# Patient Record
Sex: FEMALE | Race: WHITE | NOT HISPANIC OR LATINO
[De-identification: names, ages, dates, MRNs, and addresses within clinical notes are randomized per-mention and may not be internally consistent; named-entity substitution may affect disease eponyms.]

---
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
## 2020-09-30 ENCOUNTER — APPOINTMENT (OUTPATIENT)
Dept: OBGYN | Facility: CLINIC | Age: 20
End: 2020-09-30

## 2021-05-20 ENCOUNTER — APPOINTMENT (OUTPATIENT)
Dept: OBGYN | Facility: CLINIC | Age: 21
End: 2021-05-20
Payer: MEDICAID

## 2021-05-20 ENCOUNTER — NON-APPOINTMENT (OUTPATIENT)
Age: 21
End: 2021-05-20

## 2021-05-20 VITALS
HEIGHT: 68.5 IN | DIASTOLIC BLOOD PRESSURE: 70 MMHG | WEIGHT: 131.25 LBS | BODY MASS INDEX: 19.66 KG/M2 | SYSTOLIC BLOOD PRESSURE: 110 MMHG

## 2021-05-20 PROCEDURE — 36415 COLL VENOUS BLD VENIPUNCTURE: CPT

## 2021-05-20 PROCEDURE — 0500F INITIAL PRENATAL CARE VISIT: CPT

## 2021-05-24 LAB
ABO + RH PNL BLD: NORMAL
ALBUMIN SERPL ELPH-MCNC: 4.2 G/DL
ALP BLD-CCNC: 50 U/L
ALT SERPL-CCNC: 41 U/L
ANION GAP SERPL CALC-SCNC: 12 MMOL/L
AST SERPL-CCNC: 26 U/L
BASOPHILS # BLD AUTO: 0.02 K/UL
BASOPHILS NFR BLD AUTO: 0.3 %
BILIRUB SERPL-MCNC: 0.3 MG/DL
BLD GP AB SCN SERPL QL: NORMAL
BUN SERPL-MCNC: 10 MG/DL
C TRACH RRNA SPEC QL NAA+PROBE: NOT DETECTED
CALCIUM SERPL-MCNC: 9.3 MG/DL
CHLORIDE SERPL-SCNC: 102 MMOL/L
CMV IGG SERPL QL: <0.2 U/ML
CMV IGG SERPL-IMP: NEGATIVE
CMV IGM SERPL QL: <8 AU/ML
CMV IGM SERPL QL: NEGATIVE
CO2 SERPL-SCNC: 24 MMOL/L
CREAT SERPL-MCNC: 0.64 MG/DL
EOSINOPHIL # BLD AUTO: 0.04 K/UL
EOSINOPHIL NFR BLD AUTO: 0.6 %
GLUCOSE SERPL-MCNC: 81 MG/DL
HBV SURFACE AG SER QL: NONREACTIVE
HCT VFR BLD CALC: 34 %
HCV AB SER QL: NONREACTIVE
HCV S/CO RATIO: 0.47 S/CO
HGB BLD-MCNC: 10.6 G/DL
HIV1+2 AB SPEC QL IA.RAPID: NONREACTIVE
HSV 1+2 IGG SER IA-IMP: NEGATIVE
HSV 1+2 IGG SER IA-IMP: POSITIVE
HSV1 IGG SER QL: 0.09 INDEX
HSV2 IGG SER QL: 2.05 INDEX
IMM GRANULOCYTES NFR BLD AUTO: 0.1 %
LYMPHOCYTES # BLD AUTO: 0.97 K/UL
LYMPHOCYTES NFR BLD AUTO: 14.3 %
MAN DIFF?: NORMAL
MCHC RBC-ENTMCNC: 22.8 PG
MCHC RBC-ENTMCNC: 31.2 GM/DL
MCV RBC AUTO: 73.3 FL
MEV IGG FLD QL IA: 9.2 AU/ML
MEV IGG+IGM SER-IMP: NEGATIVE
MONOCYTES # BLD AUTO: 0.26 K/UL
MONOCYTES NFR BLD AUTO: 3.8 %
N GONORRHOEA RRNA SPEC QL NAA+PROBE: NOT DETECTED
NEUTROPHILS # BLD AUTO: 5.5 K/UL
NEUTROPHILS NFR BLD AUTO: 80.9 %
PLATELET # BLD AUTO: 229 K/UL
POTASSIUM SERPL-SCNC: 3.9 MMOL/L
PROT SERPL-MCNC: 7 G/DL
RBC # BLD: 4.64 M/UL
RBC # FLD: 16.9 %
RUBV IGG FLD-ACNC: 2.5 INDEX
RUBV IGG SER-IMP: POSITIVE
SODIUM SERPL-SCNC: 137 MMOL/L
SOURCE AMPLIFICATION: NORMAL
T GONDII AB SER-IMP: NEGATIVE
T GONDII AB SER-IMP: NEGATIVE
T GONDII IGG SER QL: <3 IU/ML
T GONDII IGM SER QL: <3 AU/ML
T PALLIDUM AB SER QL IA: NEGATIVE
TSH SERPL-ACNC: 0.88 UIU/ML
VZV AB TITR SER: NORMAL
VZV IGG SER IF-ACNC: 142 INDEX
WBC # FLD AUTO: 6.8 K/UL

## 2021-05-26 LAB
B19V IGG SER QL IA: 0.81 INDEX
B19V IGG+IGM SER-IMP: NEGATIVE
B19V IGG+IGM SER-IMP: NORMAL
B19V IGM FLD-ACNC: 0.12 INDEX
B19V IGM SER-ACNC: NEGATIVE
HGB A MFR BLD: 94.5 %
HGB A2 MFR BLD: 5 %
HGB F MFR BLD: 0.5 %
HGB FRACT BLD-IMP: NORMAL
HSV1 IGM SER QL: NORMAL TITER
HSV2 AB FLD-ACNC: NORMAL TITER

## 2021-05-28 LAB — FMR1 GENE MUT ANL BLD/T: NORMAL

## 2021-06-01 ENCOUNTER — EMERGENCY (EMERGENCY)
Facility: HOSPITAL | Age: 21
LOS: 1 days | Discharge: HOME | End: 2021-06-01
Attending: EMERGENCY MEDICINE | Admitting: EMERGENCY MEDICINE
Payer: MEDICAID

## 2021-06-01 VITALS
DIASTOLIC BLOOD PRESSURE: 69 MMHG | RESPIRATION RATE: 16 BRPM | SYSTOLIC BLOOD PRESSURE: 106 MMHG | WEIGHT: 130.07 LBS | HEART RATE: 85 BPM | OXYGEN SATURATION: 100 % | HEIGHT: 69 IN | TEMPERATURE: 98 F

## 2021-06-01 VITALS
OXYGEN SATURATION: 100 % | HEART RATE: 68 BPM | RESPIRATION RATE: 18 BRPM | DIASTOLIC BLOOD PRESSURE: 76 MMHG | SYSTOLIC BLOOD PRESSURE: 108 MMHG | TEMPERATURE: 98 F

## 2021-06-01 DIAGNOSIS — O21.9 VOMITING OF PREGNANCY, UNSPECIFIED: ICD-10-CM

## 2021-06-01 DIAGNOSIS — O99.611 DISEASES OF THE DIGESTIVE SYSTEM COMPLICATING PREGNANCY, FIRST TRIMESTER: ICD-10-CM

## 2021-06-01 DIAGNOSIS — Z3A.08 8 WEEKS GESTATION OF PREGNANCY: ICD-10-CM

## 2021-06-01 DIAGNOSIS — O21.0 MILD HYPEREMESIS GRAVIDARUM: ICD-10-CM

## 2021-06-01 DIAGNOSIS — K22.4 DYSKINESIA OF ESOPHAGUS: ICD-10-CM

## 2021-06-01 LAB
ALBUMIN SERPL ELPH-MCNC: 4 G/DL — SIGNIFICANT CHANGE UP (ref 3.3–5)
ALP SERPL-CCNC: 51 U/L — SIGNIFICANT CHANGE UP (ref 40–120)
ALT FLD-CCNC: 28 U/L — SIGNIFICANT CHANGE UP (ref 10–45)
ANION GAP SERPL CALC-SCNC: 12 MMOL/L — SIGNIFICANT CHANGE UP (ref 5–17)
AR GENE MUT ANL BLD/T: NORMAL
AST SERPL-CCNC: 21 U/L — SIGNIFICANT CHANGE UP (ref 10–40)
BASOPHILS # BLD AUTO: 0 K/UL — SIGNIFICANT CHANGE UP (ref 0–0.2)
BASOPHILS NFR BLD AUTO: 0 % — SIGNIFICANT CHANGE UP (ref 0–2)
BILIRUB SERPL-MCNC: 0.3 MG/DL — SIGNIFICANT CHANGE UP (ref 0.2–1.2)
BUN SERPL-MCNC: 10 MG/DL — SIGNIFICANT CHANGE UP (ref 7–23)
CALCIUM SERPL-MCNC: 9.2 MG/DL — SIGNIFICANT CHANGE UP (ref 8.4–10.5)
CHLORIDE SERPL-SCNC: 102 MMOL/L — SIGNIFICANT CHANGE UP (ref 96–108)
CO2 SERPL-SCNC: 22 MMOL/L — SIGNIFICANT CHANGE UP (ref 22–31)
CREAT SERPL-MCNC: 0.51 MG/DL — SIGNIFICANT CHANGE UP (ref 0.5–1.3)
EOSINOPHIL # BLD AUTO: 0 K/UL — SIGNIFICANT CHANGE UP (ref 0–0.5)
EOSINOPHIL NFR BLD AUTO: 0 % — SIGNIFICANT CHANGE UP (ref 0–6)
GLUCOSE SERPL-MCNC: 90 MG/DL — SIGNIFICANT CHANGE UP (ref 70–99)
HCG SERPL-ACNC: HIGH MIU/ML
HCT VFR BLD CALC: 32.3 % — LOW (ref 34.5–45)
HGB BLD-MCNC: 10.5 G/DL — LOW (ref 11.5–15.5)
HYPOCHROMIA BLD QL: SLIGHT — SIGNIFICANT CHANGE UP
LIDOCAIN IGE QN: 26 U/L — SIGNIFICANT CHANGE UP (ref 7–60)
LYMPHOCYTES # BLD AUTO: 1.39 K/UL — SIGNIFICANT CHANGE UP (ref 1–3.3)
LYMPHOCYTES # BLD AUTO: 16.5 % — SIGNIFICANT CHANGE UP (ref 13–44)
MANUAL SMEAR VERIFICATION: SIGNIFICANT CHANGE UP
MCHC RBC-ENTMCNC: 23 PG — LOW (ref 27–34)
MCHC RBC-ENTMCNC: 32.5 GM/DL — SIGNIFICANT CHANGE UP (ref 32–36)
MCV RBC AUTO: 70.7 FL — LOW (ref 80–100)
MONOCYTES # BLD AUTO: 0.08 K/UL — SIGNIFICANT CHANGE UP (ref 0–0.9)
MONOCYTES NFR BLD AUTO: 0.9 % — LOW (ref 2–14)
NEUTROPHILS # BLD AUTO: 6.8 K/UL — SIGNIFICANT CHANGE UP (ref 1.8–7.4)
NEUTROPHILS NFR BLD AUTO: 80 % — HIGH (ref 43–77)
NEUTS BAND # BLD: 0.9 % — SIGNIFICANT CHANGE UP (ref 0–8)
PLAT MORPH BLD: NORMAL — SIGNIFICANT CHANGE UP
PLATELET # BLD AUTO: 226 K/UL — SIGNIFICANT CHANGE UP (ref 150–400)
POIKILOCYTOSIS BLD QL AUTO: SLIGHT — SIGNIFICANT CHANGE UP
POLYCHROMASIA BLD QL SMEAR: SLIGHT — SIGNIFICANT CHANGE UP
POTASSIUM SERPL-MCNC: 4.1 MMOL/L — SIGNIFICANT CHANGE UP (ref 3.5–5.3)
POTASSIUM SERPL-SCNC: 4.1 MMOL/L — SIGNIFICANT CHANGE UP (ref 3.5–5.3)
PROT SERPL-MCNC: 7.2 G/DL — SIGNIFICANT CHANGE UP (ref 6–8.3)
RBC # BLD: 4.57 M/UL — SIGNIFICANT CHANGE UP (ref 3.8–5.2)
RBC # FLD: 15.9 % — HIGH (ref 10.3–14.5)
RBC BLD AUTO: ABNORMAL
SCHISTOCYTES BLD QL AUTO: SLIGHT — SIGNIFICANT CHANGE UP
SODIUM SERPL-SCNC: 136 MMOL/L — SIGNIFICANT CHANGE UP (ref 135–145)
VARIANT LYMPHS # BLD: 1.7 % — SIGNIFICANT CHANGE UP (ref 0–6)
WBC # BLD: 8.41 K/UL — SIGNIFICANT CHANGE UP (ref 3.8–10.5)
WBC # FLD AUTO: 8.41 K/UL — SIGNIFICANT CHANGE UP (ref 3.8–10.5)

## 2021-06-01 PROCEDURE — 36415 COLL VENOUS BLD VENIPUNCTURE: CPT

## 2021-06-01 PROCEDURE — 85025 COMPLETE CBC W/AUTO DIFF WBC: CPT

## 2021-06-01 PROCEDURE — 93005 ELECTROCARDIOGRAM TRACING: CPT

## 2021-06-01 PROCEDURE — 83690 ASSAY OF LIPASE: CPT

## 2021-06-01 PROCEDURE — 84100 ASSAY OF PHOSPHORUS: CPT

## 2021-06-01 PROCEDURE — 80053 COMPREHEN METABOLIC PANEL: CPT

## 2021-06-01 PROCEDURE — 99284 EMERGENCY DEPT VISIT MOD MDM: CPT | Mod: 25

## 2021-06-01 PROCEDURE — 96375 TX/PRO/DX INJ NEW DRUG ADDON: CPT

## 2021-06-01 PROCEDURE — 83735 ASSAY OF MAGNESIUM: CPT

## 2021-06-01 PROCEDURE — 84702 CHORIONIC GONADOTROPIN TEST: CPT

## 2021-06-01 PROCEDURE — 99284 EMERGENCY DEPT VISIT MOD MDM: CPT

## 2021-06-01 PROCEDURE — 96374 THER/PROPH/DIAG INJ IV PUSH: CPT

## 2021-06-01 RX ORDER — SODIUM CHLORIDE 9 MG/ML
1000 INJECTION INTRAMUSCULAR; INTRAVENOUS; SUBCUTANEOUS ONCE
Refills: 0 | Status: COMPLETED | OUTPATIENT
Start: 2021-06-01 | End: 2021-06-01

## 2021-06-01 RX ORDER — ONDANSETRON 8 MG/1
4 TABLET, FILM COATED ORAL ONCE
Refills: 0 | Status: COMPLETED | OUTPATIENT
Start: 2021-06-01 | End: 2021-06-01

## 2021-06-01 RX ORDER — ACETAMINOPHEN 500 MG
1000 TABLET ORAL ONCE
Refills: 0 | Status: COMPLETED | OUTPATIENT
Start: 2021-06-01 | End: 2021-06-01

## 2021-06-01 RX ORDER — ONDANSETRON 8 MG/1
1 TABLET, FILM COATED ORAL
Qty: 16 | Refills: 0
Start: 2021-06-01

## 2021-06-01 RX ADMIN — Medication 400 MILLIGRAM(S): at 17:58

## 2021-06-01 RX ADMIN — SODIUM CHLORIDE 1000 MILLILITER(S): 9 INJECTION INTRAMUSCULAR; INTRAVENOUS; SUBCUTANEOUS at 18:15

## 2021-06-01 RX ADMIN — Medication 1000 MILLIGRAM(S): at 18:10

## 2021-06-01 RX ADMIN — SODIUM CHLORIDE 1000 MILLILITER(S): 9 INJECTION INTRAMUSCULAR; INTRAVENOUS; SUBCUTANEOUS at 17:57

## 2021-06-01 RX ADMIN — ONDANSETRON 4 MILLIGRAM(S): 8 TABLET, FILM COATED ORAL at 17:57

## 2021-06-01 NOTE — ED ADULT NURSE NOTE - OBJECTIVE STATEMENT
presents ambulatory to ED complaining of abd pain with nausea and vomiting. Pt reports mild nausea and intermittent vomiting since the beginning of her pregnancy.  PT started with abd pain and today felt fatigued and a generalized weakness.  Pt also had some chest heaviness today.   Denies sob, uri symptoms, bleeding, focal weakness, and urinary symptoms.

## 2021-06-01 NOTE — ED ADULT TRIAGE NOTE - CHIEF COMPLAINT QUOTE
Patient reports 9 weeks gestation LMP 03/23. Patient reports was sent to ED by OB/GYN, patient complains of weakness, lightheadedness, dizziness since yesterday, SOB, chest pressure, nausea and vomiting since yesterday. Patient speaks in clear and full sentences, ambulatory with steady gait. EKG in progress.

## 2021-06-01 NOTE — ED PROVIDER NOTE - PATIENT PORTAL LINK FT
You can access the FollowMyHealth Patient Portal offered by [facility] by registering at the following website: [email or web address]. By joining [facility]’s FollowMyHealth portal, you will also be able to view your health information using other applications (apps) compatible with our system.

## 2021-06-01 NOTE — CONSULT NOTE ADULT - SUBJECTIVE AND OBJECTIVE BOX
19yo  at 10+0wga by LMP c/w 1st TM US presents with a presyncopal episode after an episode of nausea and vomiting. Pt reports feeling nausea and vomiting for the past several weeks and has been taking dicelgis with significant improvement in her sxs. Two days ago she ran out and then today she had an episode of intense nausea with "aggressive" vomiting. Afterwards, pt felt lightheaded, dizzy, and had palpitations. Since then, pt was able to have a cassy with yogurt sandwich and several sips of water without emesis. Pt reports that she still feels nauseous and has mild abdominal cramping (that feels like a stomach-ache, per pt). Pt denies fevers, chills, vaginal bleeding, urinary sxs, severe abdominal pain, constipation, diarrhea. Pt reports that pregnancy has otherwise been uncomplicated, she follows with Dr. Webb.       OB Hx: G1: current  GYN Hx: denies STIs, fibroids, ovarian cysts  PMHx: denies  SHx: denies  Meds: diclegis   Allergies: NKDA      PHYSICAL EXAM:   Vital Signs Last 24 Hrs  T(C): 36.7 (2021 16:56), Max: 36.7 (2021 16:56)  T(F): 98 (2021 16:56), Max: 98 (2021 16:56)  HR: 85 (2021 16:56) (85 - 85)  BP: 106/69 (2021 16:56) (106/69 - 106/69)  BP(mean): --  RR: 16 (2021 16:56) (16 - 16)  SpO2: 100% (2021 16:56) (100% - 100%)    **************************  Constitutional: Alert & Oriented x3, No acute distress  Respiratory: Clear to ausculation bilaterally; no wheezing, rhonchi, or crackles  Cardiovascular: regular rate and rhythm, no murmurs, or gallops  Gastrointestinal: soft, mild diffuse tenderness b/l lower abdomen, positive bowel sounds, no rebound or guarding   Pelvic exam: deferred   Extremities: no calf tenderness or swelling    LABS:                        10.5   8.41  )-----------( 226      ( 2021 17:55 )             32.3     06-    136  |  102  |  10  ----------------------------<  90  4.1   |  22  |  0.51    Ca    9.2      2021 17:55              RADIOLOGY & ADDITIONAL STUDIES: 21yo  at 10+0wga by LMP c/w 1st TM US presents with a presyncopal episode after an episode of nausea and vomiting. Pt reports feeling nausea and vomiting for the past several weeks and has been taking dicelgis with significant improvement in her sxs. Two days ago she ran out and then today she had an episode of intense nausea with "aggressive" vomiting. Afterwards, pt felt lightheaded, dizzy, and had palpitations. Since then, pt was able to have a cassy with yogurt sandwich and several sips of water without emesis. Pt reports that she still feels nauseous and has mild abdominal cramping (that feels like a stomach-ache, per pt). Pt denies fevers, chills, vaginal bleeding, urinary sxs, severe abdominal pain, constipation, diarrhea. Pt reports that pregnancy has otherwise been uncomplicated, she follows with Dr. Webb.       OB Hx: G1: current  GYN Hx: denies STIs, fibroids, ovarian cysts  PMHx: denies  SHx: denies  Meds: diclegis   Allergies: NKDA      PHYSICAL EXAM:   Vital Signs Last 24 Hrs  T(C): 36.7 (2021 16:56), Max: 36.7 (2021 16:56)  T(F): 98 (2021 16:56), Max: 98 (2021 16:56)  HR: 85 (2021 16:56) (85 - 85)  BP: 106/69 (2021 16:56) (106/69 - 106/69)  BP(mean): --  RR: 16 (2021 16:56) (16 - 16)  SpO2: 100% (2021 16:56) (100% - 100%)    **************************  Constitutional: Alert & Oriented x3, No acute distress, appears well and is able to laugh frequently during conversation  Respiratory: Clear to ausculation bilaterally; no wheezing, rhonchi, or crackles  Cardiovascular: regular rate and rhythm, no murmurs, or gallops  Gastrointestinal: soft, mild diffuse tenderness b/l lower abdomen, positive bowel sounds, no rebound or guarding   Pelvic exam: deferred   Extremities: no calf tenderness or swelling    LABS:                        10.5   8.41  )-----------( 226      ( 2021 17:55 )             32.3     06-    136  |  102  |  10  ----------------------------<  90  4.1   |  22  |  0.51    Ca    9.2      2021 17:55              RADIOLOGY & ADDITIONAL STUDIES: 19yo  at 10+0wga by LMP c/w 1st TM US presents with a presyncopal episode after an episode of nausea and vomiting. Pt reports feeling nausea and vomiting for the past several weeks and has been taking dicelgis with significant improvement in her sxs. Two days ago she ran out and then today she had an episode of intense nausea with "aggressive" vomiting. Afterwards, pt felt lightheaded, dizzy, and had palpitations. Since then, pt was able to have a cassy with yogurt and several sips of water without emesis. Pt reports that she still feels nauseous and has mild abdominal cramping (that feels like a stomach-ache, per pt). Pt denies fevers, chills, vaginal bleeding, urinary sxs, severe abdominal pain, constipation, diarrhea. Pt reports that pregnancy has otherwise been uncomplicated, she follows with Dr. Webb.       OB Hx: G1: current  GYN Hx: denies STIs, fibroids, ovarian cysts  PMHx: denies  SHx: denies  Meds: diclegis   Allergies: NKDA      PHYSICAL EXAM:   Vital Signs Last 24 Hrs  T(C): 36.7 (2021 16:56), Max: 36.7 (2021 16:56)  T(F): 98 (2021 16:56), Max: 98 (2021 16:56)  HR: 85 (2021 16:56) (85 - 85)  BP: 106/69 (2021 16:56) (106/69 - 106/69)  BP(mean): --  RR: 16 (2021 16:56) (16 - 16)  SpO2: 100% (2021 16:56) (100% - 100%)    **************************  Constitutional: Alert & Oriented x3, No acute distress, appears well and is able to laugh frequently during conversation  Respiratory: Clear to ausculation bilaterally; no wheezing, rhonchi, or crackles  Cardiovascular: regular rate and rhythm, no murmurs, or gallops  Gastrointestinal: soft, mild diffuse tenderness b/l lower abdomen, positive bowel sounds, no rebound or guarding   Pelvic exam: deferred   Extremities: no calf tenderness or swelling      TAUS: +FH, +FM Adequate fluid    LABS:                        10.5   8.41  )-----------( 226      ( 2021 17:55 )             32.3     -    136  |  102  |  10  ----------------------------<  90  4.1   |  22  |  0.51    Ca    9.2      2021 17:55              RADIOLOGY & ADDITIONAL STUDIES:

## 2021-06-01 NOTE — ED PROVIDER NOTE - PHYSICAL EXAMINATION
no LE edema, normal equal distal pulses, steady unassisted gait.  abd: soft, minimal diffuse ttp, no rebound/guarding. no LE edema, normal equal distal pulses, steady unassisted gait.  abd: soft, minimal diffuse ttp, no rebound/guarding.  unofficial bedside sono: .

## 2021-06-01 NOTE — ED PROVIDER NOTE - PROGRESS NOTE DETAILS
[place]: labs grossly wnl. pt feeling much better, tolerating po. still w/ minimal periumbilcal pain (has been intermittent for several weeks). abd soft, minimal improved ttp on exam. benign abdomen.  rediscussed w/ GYN.  Clinically no indication for further emergent ED workup or hospitalization at this time. Comfortable for dc, outpt f/u.

## 2021-06-01 NOTE — ED PROVIDER NOTE - NSFOLLOWUPINSTRUCTIONS_ED_ALL_ED_FT
For nausea and vomiting: Take 10mg of pyridoxine and 10mg of doxylamine at bedtime. If that does not work increase to 20mg of each medicine on a daily basis at bedtime; if symptoms not adequately controlled, increase dose to 40mg tablets each day (10mg of each pill in AM, 10mg of each pill in the mid-afternoon, and 20mg of each pill at bedtime)     Can take tylenol 650mg every 6hrs as needed for mild pain.     Stay well hydrated.    Return for worsening abdominal pain, vaginal bleeding, fevers, persistent vomit, uncontrolled pain, worsening breathing, worsening lightheaded.    Follow up with OBGYN. Can also call [phone] to schedule appointment.     Hyperemesis Gravidarum    Hyperemesis gravidarum is a severe form of nausea and vomiting that happens during pregnancy. Hyperemesis is worse than morning sickness. It may cause you to have nausea or vomiting all day for many days. It may keep you from eating and drinking enough food and liquids, which can lead to dehydration, malnutrition, and weight loss. Hyperemesis usually occurs during the first half (the first 20 weeks) of pregnancy. It often goes away once a woman is in her second half of pregnancy. However, sometimes hyperemesis continues through an entire pregnancy.    What are the causes?  The cause of this condition is not known. It may be related to changes in chemicals (hormones) in the body during pregnancy, such as the high level of pregnancy hormone (human chorionic gonadotropin) or the increase in the female sex hormone (estrogen).    What are the signs or symptoms?  Symptoms of this condition include:  Nausea that does not go away. Vomiting that does not allow you to keep any food down. Weight loss. Body fluid loss (dehydration). Having no desire to eat, or not liking food that you have previously enjoyed.    How is this diagnosed?  This condition may be diagnosed based on:  A physical exam. Your medical history. Your symptoms. Blood tests. Urine tests.    How is this treated?  This condition is managed by controlling symptoms. This may include:  Following an eating plan. This can help lessen nausea and vomiting. Taking prescription medicines. An eating plan and medicines are often used together to help control symptoms. If medicines do not help relieve nausea and vomiting, you may need to receive fluids through an IV at the hospital.    Follow these instructions at home:    Eating and drinking     Avoid the following:  Drinking fluids with meals. Try not to drink anything during the 30 minutes before and after your meals. Drinking more than 1 cup of fluid at a time. Eating foods that trigger your symptoms. These may include spicy foods, coffee, high-fat foods, very sweet foods, and acidic foods. Skipping meals. Nausea can be more intense on an empty stomach. If you cannot tolerate food, do not force it. Try sucking on ice chips or other frozen items and make up for missed calories later. Lying down within 2 hours after eating. Being exposed to environmental triggers. These may include food smells, smoky rooms, closed spaces, rooms with strong smells, warm or humid places, overly loud and noisy rooms, and rooms with motion or flickering lights. Try eating meals in a well-ventilated area that is free of strong smells. Quick and sudden changes in your movement. Taking iron pills and multivitamins that contain iron. If you take prescription iron pills, do not stop taking them unless your health care provider approves. Preparing food. The smell of food can spoil your appetite or trigger nausea. To help relieve your symptoms:  Listen to your body. Everyone is different and has different preferences. Find what works best for you. Eat and drink slowly. Eat 5–6 small meals daily instead of 3 large meals. Eating small meals and snacks can help you avoid an empty stomach. In the morning, before getting out of bed, eat a couple of crackers to avoid moving around on an empty stomach. Try eating starchy foods as these are usually tolerated well. Examples include cereal, toast, bread, potatoes, pasta, rice, and pretzels. Include at least 1 serving of protein with your meals and snacks. Protein options include lean meats, poultry, seafood, beans, nuts, nut butters, eggs, cheese, and yogurt. Try eating a protein-rich snack before bed. Examples of a protein-eva snack include cheese and crackers or a peanut butter sandwich made with 1 slice of whole-wheat bread and 1 tsp (5 g) of peanut butter. Eat or suck on things that have ginger in them. It may help relieve nausea. Add ¼ tsp ground ginger to hot tea or choose ginger tea. Try drinking 100% fruit juice or an electrolyte drink. An electrolyte drink contains sodium, potassium, and chloride. Drink fluids that are cold, clear, and carbonated or sour. Examples include lemonade, ginger ale, lemon–lime soda, ice water, and sparkling water. Brush your teeth or use a mouth rinse after meals. Talk with your health care provider about starting a supplement of vitamin B6.    General instructions     Take over-the-counter and prescription medicines only as told by your health care provider. Follow instructions from your health care provider about eating or drinking restrictions. Continue to take your prenatal vitamins as told by your health care provider. If you are having trouble taking your prenatal vitamins, talk with your health care provider about different options. Keep all follow-up and pre-birth (prenatal) visits as told by your health care provider. This is important. Contact a health care provider if:  You have pain in your abdomen. You have a severe headache. You have vision problems. You are losing weight. You feel weak or dizzy.     Get help right away if:  You cannot drink fluids without vomiting. You vomit blood. You have constant nausea and vomiting. You are very weak. You faint. You have a fever and your symptoms suddenly get worse.

## 2021-06-01 NOTE — ED ADULT NURSE REASSESSMENT NOTE - NS ED NURSE REASSESS COMMENT FT1
pt reports improvement in nausea.  PT reports no relief with tylenol to abd pain. PT is reporting overall improvement in how she is feeling overall.  Pt feels less weak and fatigued.

## 2021-06-01 NOTE — ED PROVIDER NOTE - CARE PLAN
Principal Discharge DX:	Vomiting   Principal Discharge DX:	Vomiting  Secondary Diagnosis:	Hyperemesis

## 2021-06-01 NOTE — CONSULT NOTE ADULT - ASSESSMENT
21yo  at 10+0wga by LMP c/w 1st TM US presents with a presyncopal episode after an episode of nausea and vomiting  -VSS  -CBC, CMP WNL, pending serum magnesium and phosphorus  -Continue 1L bolus  -Patient is hemodynamically stable and no longer feeling presyncopal  -Pt was able to tolerate food and drink before coming to ED. Recommend another PO challenge before d/c   -Recommend refill of pt's  19yo  at 10+0wga by LMP c/w 1st TM US presents with a presyncopal episode after an episode of nausea and vomiting  -VSS  -CBC, CMP WNL, pending serum magnesium and phosphorus  -Continue 1L bolus  -Patient is hemodynamically stable and no longer feeling presyncopal, looks   -Pt was able to tolerate food and drink before coming to ED. Recommend another PO challenge before d/c   -Recommend refill of pt's  21yo  at 10+0wga by LMP c/w 1st TM US presents with a presyncopal episode after an episode of nausea and vomiting  -VSS  -CBC, CMP WNL, pending serum magnesium and phosphorus  -Continue 1L bolus  -Patient is hemodynamically stable and no longer feeling presyncopal, does not appear toxic, no concern for acute abdomen on physical exam   -Pt was able to tolerate food and drink before coming to ED. Recommend another PO challenge before d/c   -Recommend refill of pt's diclegis  -Will f/u with Dr. Webb outpatient    Discussed with Dr. Roldan  19yo  at 10+0wga by LMP c/w 1st TM US presents with a presyncopal episode after an episode of nausea and vomiting  -VSS, fetal status reassuring  -CBC, CMP WNL, pending serum magnesium and phosphorus  -Continue 1L bolus  -Patient is hemodynamically stable and no longer feeling presyncopal, does not appear toxic, no concern for acute abdomen on physical exam   -Pt was able to tolerate food and drink before coming to ED. Recommend another PO challenge before d/c   -Recommend refill of pt's diclegis  -Will f/u with Dr. Webb outpatient    Discussed with Dr. Roldan

## 2021-06-02 ENCOUNTER — APPOINTMENT (OUTPATIENT)
Dept: OBGYN | Facility: CLINIC | Age: 21
End: 2021-06-02
Payer: MEDICAID

## 2021-06-02 ENCOUNTER — NON-APPOINTMENT (OUTPATIENT)
Age: 21
End: 2021-06-02

## 2021-06-02 LAB — CFTR MUT TESTED BLD/T: NEGATIVE

## 2021-06-02 PROCEDURE — 0502F SUBSEQUENT PRENATAL CARE: CPT

## 2021-06-16 ENCOUNTER — NON-APPOINTMENT (OUTPATIENT)
Age: 21
End: 2021-06-16

## 2021-06-16 RX ORDER — FERROUS SULFATE 325(65) MG
325 (65 FE) TABLET ORAL 3 TIMES DAILY
Qty: 90 | Refills: 3 | Status: ACTIVE | COMMUNITY
Start: 2021-06-16 | End: 1900-01-01

## 2021-06-16 RX ORDER — DOXYLAMINE SUCCINATE AND PYRIDOXINE HYDROCHLORIDE 10; 10 MG/1; MG/1
10-10 TABLET, DELAYED RELEASE ORAL
Qty: 30 | Refills: 3 | Status: ACTIVE | COMMUNITY
Start: 2021-06-16 | End: 1900-01-01

## 2021-06-17 ENCOUNTER — ASOB RESULT (OUTPATIENT)
Age: 21
End: 2021-06-17

## 2021-06-17 ENCOUNTER — LABORATORY RESULT (OUTPATIENT)
Age: 21
End: 2021-06-17

## 2021-06-17 ENCOUNTER — APPOINTMENT (OUTPATIENT)
Dept: ANTEPARTUM | Facility: CLINIC | Age: 21
End: 2021-06-17
Payer: MEDICAID

## 2021-06-17 PROCEDURE — 76801 OB US < 14 WKS SINGLE FETUS: CPT

## 2021-06-17 PROCEDURE — 76813 OB US NUCHAL MEAS 1 GEST: CPT

## 2021-06-17 PROCEDURE — 99072 ADDL SUPL MATRL&STAF TM PHE: CPT

## 2021-06-24 ENCOUNTER — NON-APPOINTMENT (OUTPATIENT)
Age: 21
End: 2021-06-24

## 2021-06-24 ENCOUNTER — APPOINTMENT (OUTPATIENT)
Dept: OBGYN | Facility: CLINIC | Age: 21
End: 2021-06-24
Payer: MEDICAID

## 2021-06-24 VITALS
DIASTOLIC BLOOD PRESSURE: 60 MMHG | BODY MASS INDEX: 19.25 KG/M2 | WEIGHT: 127 LBS | SYSTOLIC BLOOD PRESSURE: 100 MMHG | HEIGHT: 68 IN

## 2021-06-24 PROCEDURE — 0502F SUBSEQUENT PRENATAL CARE: CPT

## 2021-06-29 ENCOUNTER — INPATIENT (INPATIENT)
Facility: HOSPITAL | Age: 21
LOS: 0 days | Discharge: HOME | DRG: 833 | End: 2021-06-30
Attending: SURGERY | Admitting: SURGERY
Payer: MEDICAID

## 2021-06-29 VITALS
WEIGHT: 126.99 LBS | HEIGHT: 69 IN | RESPIRATION RATE: 18 BRPM | SYSTOLIC BLOOD PRESSURE: 99 MMHG | OXYGEN SATURATION: 99 % | DIASTOLIC BLOOD PRESSURE: 63 MMHG | TEMPERATURE: 98 F | HEART RATE: 72 BPM

## 2021-06-29 LAB
ALBUMIN SERPL ELPH-MCNC: 3.9 G/DL — SIGNIFICANT CHANGE UP (ref 3.3–5)
ALP SERPL-CCNC: 52 U/L — SIGNIFICANT CHANGE UP (ref 40–120)
ALT FLD-CCNC: 15 U/L — SIGNIFICANT CHANGE UP (ref 10–45)
ANION GAP SERPL CALC-SCNC: 13 MMOL/L — SIGNIFICANT CHANGE UP (ref 5–17)
ANISOCYTOSIS BLD QL: SLIGHT — SIGNIFICANT CHANGE UP
APPEARANCE UR: CLEAR — SIGNIFICANT CHANGE UP
AST SERPL-CCNC: 22 U/L — SIGNIFICANT CHANGE UP (ref 10–40)
BACTERIA # UR AUTO: PRESENT /HPF
BASOPHILS # BLD AUTO: 0 K/UL — SIGNIFICANT CHANGE UP (ref 0–0.2)
BASOPHILS NFR BLD AUTO: 0 % — SIGNIFICANT CHANGE UP (ref 0–2)
BILIRUB SERPL-MCNC: 0.3 MG/DL — SIGNIFICANT CHANGE UP (ref 0.2–1.2)
BILIRUB UR-MCNC: NEGATIVE — SIGNIFICANT CHANGE UP
BLD GP AB SCN SERPL QL: NEGATIVE — SIGNIFICANT CHANGE UP
BUN SERPL-MCNC: 9 MG/DL — SIGNIFICANT CHANGE UP (ref 7–23)
CALCIUM SERPL-MCNC: 9.2 MG/DL — SIGNIFICANT CHANGE UP (ref 8.4–10.5)
CHLORIDE SERPL-SCNC: 102 MMOL/L — SIGNIFICANT CHANGE UP (ref 96–108)
CO2 SERPL-SCNC: 22 MMOL/L — SIGNIFICANT CHANGE UP (ref 22–31)
COLOR SPEC: YELLOW — SIGNIFICANT CHANGE UP
COMMENT - URINE: SIGNIFICANT CHANGE UP
CREAT SERPL-MCNC: 0.51 MG/DL — SIGNIFICANT CHANGE UP (ref 0.5–1.3)
DIFF PNL FLD: NEGATIVE — SIGNIFICANT CHANGE UP
EOSINOPHIL # BLD AUTO: 0 K/UL — SIGNIFICANT CHANGE UP (ref 0–0.5)
EOSINOPHIL NFR BLD AUTO: 0 % — SIGNIFICANT CHANGE UP (ref 0–6)
EPI CELLS # UR: ABNORMAL /HPF (ref 0–5)
GIANT PLATELETS BLD QL SMEAR: PRESENT — SIGNIFICANT CHANGE UP
GLUCOSE SERPL-MCNC: 79 MG/DL — SIGNIFICANT CHANGE UP (ref 70–99)
GLUCOSE UR QL: NEGATIVE — SIGNIFICANT CHANGE UP
HCG SERPL-ACNC: HIGH MIU/ML
HCT VFR BLD CALC: 32 % — LOW (ref 34.5–45)
HGB BLD-MCNC: 10.2 G/DL — LOW (ref 11.5–15.5)
HYPOCHROMIA BLD QL: SLIGHT — SIGNIFICANT CHANGE UP
KETONES UR-MCNC: 40 MG/DL
LEUKOCYTE ESTERASE UR-ACNC: NEGATIVE — SIGNIFICANT CHANGE UP
LYMPHOCYTES # BLD AUTO: 0.69 K/UL — LOW (ref 1–3.3)
LYMPHOCYTES # BLD AUTO: 8.7 % — LOW (ref 13–44)
MACROCYTES BLD QL: SLIGHT — SIGNIFICANT CHANGE UP
MANUAL SMEAR VERIFICATION: SIGNIFICANT CHANGE UP
MCHC RBC-ENTMCNC: 22.9 PG — LOW (ref 27–34)
MCHC RBC-ENTMCNC: 31.9 GM/DL — LOW (ref 32–36)
MCV RBC AUTO: 71.7 FL — LOW (ref 80–100)
MICROCYTES BLD QL: SLIGHT — SIGNIFICANT CHANGE UP
MONOCYTES # BLD AUTO: 0.21 K/UL — SIGNIFICANT CHANGE UP (ref 0–0.9)
MONOCYTES NFR BLD AUTO: 2.6 % — SIGNIFICANT CHANGE UP (ref 2–14)
NEUTROPHILS # BLD AUTO: 7.03 K/UL — SIGNIFICANT CHANGE UP (ref 1.8–7.4)
NEUTROPHILS NFR BLD AUTO: 88.7 % — HIGH (ref 43–77)
NITRITE UR-MCNC: NEGATIVE — SIGNIFICANT CHANGE UP
OVALOCYTES BLD QL SMEAR: SLIGHT — SIGNIFICANT CHANGE UP
PH UR: 7 — SIGNIFICANT CHANGE UP (ref 5–8)
PLAT MORPH BLD: ABNORMAL
PLATELET # BLD AUTO: 250 K/UL — SIGNIFICANT CHANGE UP (ref 150–400)
POIKILOCYTOSIS BLD QL AUTO: SLIGHT — SIGNIFICANT CHANGE UP
POLYCHROMASIA BLD QL SMEAR: SLIGHT — SIGNIFICANT CHANGE UP
POTASSIUM SERPL-MCNC: 4.3 MMOL/L — SIGNIFICANT CHANGE UP (ref 3.5–5.3)
POTASSIUM SERPL-SCNC: 4.3 MMOL/L — SIGNIFICANT CHANGE UP (ref 3.5–5.3)
PROT SERPL-MCNC: 7.4 G/DL — SIGNIFICANT CHANGE UP (ref 6–8.3)
PROT UR-MCNC: ABNORMAL MG/DL
RBC # BLD: 4.46 M/UL — SIGNIFICANT CHANGE UP (ref 3.8–5.2)
RBC # FLD: 16.3 % — HIGH (ref 10.3–14.5)
RBC BLD AUTO: ABNORMAL
RBC CASTS # UR COMP ASSIST: < 5 /HPF — SIGNIFICANT CHANGE UP
RH IG SCN BLD-IMP: POSITIVE — SIGNIFICANT CHANGE UP
SARS-COV-2 RNA SPEC QL NAA+PROBE: SIGNIFICANT CHANGE UP
SCHISTOCYTES BLD QL AUTO: SLIGHT — SIGNIFICANT CHANGE UP
SODIUM SERPL-SCNC: 137 MMOL/L — SIGNIFICANT CHANGE UP (ref 135–145)
SP GR SPEC: 1.02 — SIGNIFICANT CHANGE UP (ref 1–1.03)
SPHEROCYTES BLD QL SMEAR: SLIGHT — SIGNIFICANT CHANGE UP
UROBILINOGEN FLD QL: 1 E.U./DL — SIGNIFICANT CHANGE UP
WBC # BLD: 7.93 K/UL — SIGNIFICANT CHANGE UP (ref 3.8–10.5)
WBC # FLD AUTO: 7.93 K/UL — SIGNIFICANT CHANGE UP (ref 3.8–10.5)
WBC UR QL: < 5 /HPF — SIGNIFICANT CHANGE UP

## 2021-06-29 PROCEDURE — 76817 TRANSVAGINAL US OBSTETRIC: CPT | Mod: 26

## 2021-06-29 PROCEDURE — 99285 EMERGENCY DEPT VISIT HI MDM: CPT

## 2021-06-29 PROCEDURE — 76700 US EXAM ABDOM COMPLETE: CPT | Mod: 26

## 2021-06-29 PROCEDURE — 76805 OB US >/= 14 WKS SNGL FETUS: CPT | Mod: 26

## 2021-06-29 RX ORDER — ACETAMINOPHEN WITH CODEINE 300MG-30MG
2 TABLET ORAL ONCE
Refills: 0 | Status: DISCONTINUED | OUTPATIENT
Start: 2021-06-29 | End: 2021-06-29

## 2021-06-29 RX ORDER — ONDANSETRON 8 MG/1
4 TABLET, FILM COATED ORAL EVERY 6 HOURS
Refills: 0 | Status: DISCONTINUED | OUTPATIENT
Start: 2021-06-29 | End: 2021-06-30

## 2021-06-29 RX ORDER — ACETAMINOPHEN WITH CODEINE 300MG-30MG
2 TABLET ORAL EVERY 6 HOURS
Refills: 0 | Status: DISCONTINUED | OUTPATIENT
Start: 2021-06-29 | End: 2021-06-30

## 2021-06-29 RX ORDER — ONDANSETRON 8 MG/1
4 TABLET, FILM COATED ORAL ONCE
Refills: 0 | Status: COMPLETED | OUTPATIENT
Start: 2021-06-29 | End: 2021-06-29

## 2021-06-29 RX ORDER — SODIUM CHLORIDE 9 MG/ML
1000 INJECTION INTRAMUSCULAR; INTRAVENOUS; SUBCUTANEOUS ONCE
Refills: 0 | Status: COMPLETED | OUTPATIENT
Start: 2021-06-29 | End: 2021-06-29

## 2021-06-29 RX ADMIN — Medication 2 TABLET(S): at 13:38

## 2021-06-29 RX ADMIN — SODIUM CHLORIDE 1000 MILLILITER(S): 9 INJECTION INTRAMUSCULAR; INTRAVENOUS; SUBCUTANEOUS at 13:37

## 2021-06-29 RX ADMIN — ONDANSETRON 4 MILLIGRAM(S): 8 TABLET, FILM COATED ORAL at 13:39

## 2021-06-29 RX ADMIN — Medication 2 TABLET(S): at 21:16

## 2021-06-29 RX ADMIN — ONDANSETRON 4 MILLIGRAM(S): 8 TABLET, FILM COATED ORAL at 22:48

## 2021-06-29 RX ADMIN — SODIUM CHLORIDE 1000 MILLILITER(S): 9 INJECTION INTRAMUSCULAR; INTRAVENOUS; SUBCUTANEOUS at 14:40

## 2021-06-29 RX ADMIN — Medication 2 TABLET(S): at 14:08

## 2021-06-29 NOTE — H&P ADULT - NSHPLABSRESULTS_GEN_ALL_CORE
10.2   7.93  )-----------( 250      ( 29 Jun 2021 13:38 )             32.0   06-29    137  |  102  |  9   ----------------------------<  79  4.3   |  22  |  0.51    Ca    9.2      29 Jun 2021 13:38    TPro  7.4  /  Alb  3.9  /  TBili  0.3  /  DBili  x   /  AST  22  /  ALT  15  /  AlkPhos  52  06-29  < from: US Abdomen Complete (06.29.21 @ 15:10) >    EXAM:  US ABDOMEN COMPLETE                          PROCEDURE DATE:  06/29/2021          INTERPRETATION:  CLINICAL INFORMATION: Evaluate gallbladder and kidney. Rule out appendicitis. Right upper quadrant abdominal pain.    COMPARISON: None available.    TECHNIQUE: Sonography of the abdomen.    FINDINGS:    Liver: Normal echogenicity. Liver size is normal, measuring 14.2 cm in craniocaudal dimension. There are no focal lesions. The visualized portions of the hepatic and portal veins demonstratenormal directionality of flow.  Bile ducts: Normal caliber. Common bile duct measures 0.5 cm.  Gallbladder: Cholelithiasis and mobile dependent gallbladder sludge. No wall thickening or pericholecystic fluid.  Pancreas: Visualized portions are withinnormal limits.  Spleen: 10.9 cm. Within normal limits.  Right kidney: 11.1 cm. No hydronephrosis. Normal echogenicity.  Left kidney: 10.2 cm.  No hydronephrosis. Normal echogenicity.  Ascites: None.  Aorta and IVC: Visualized portions are within normal limits.    Right lower quadrant ultrasound: A linear high-frequency transducer utilizing graded compression of the right lower quadrant was performed. Appendix is visualized and is normal in caliber and compressible. No wall thickening, hypervascularity or abnormal fluid collection.    IMPRESSION:  1.  Cholelithiasis and gallbladder sludge. No evidence of cholecystitis.  2.  Normal appendix on ultrasound examination.      < end of copied text >

## 2021-06-29 NOTE — ED ADULT NURSE NOTE - OBJECTIVE STATEMENT
PT is a 21 y/o female, , 14 weeks pregnant, ambulatory with steady gait c/o RLQ abdominal pain. Pt reports N/V, lightheadedness. Pt denies vaginal bleeding. Pt talking in clear, full sentences, respirations even and unlabored. PT is a 21 y/o female, , 14 weeks pregnant, ambulatory with steady gait c/o RLQ abdominal pain. Pt reports N/V, lightheadedness. Pt denies vaginal bleeding, urinary symptoms. Pt talking in clear, full sentences, respirations even and unlabored.

## 2021-06-29 NOTE — CONSULT NOTE ADULT - SUBJECTIVE AND OBJECTIVE BOX
21 yo  at 14w by LMP of 3/23 comes in complaining of generalized abdominal pain that radiates to her back since yesterday.  Pt states her pain has been constant since yesterday and nothing has relieved her pain.  Pt also admits to vomiting 5 times since last night and feeling nauseous since the pain started yesterday. Pt denies VB, abnormal discharge, dysuria, diarrhea, SOB, chest pain, dizziness, fever, chills.       OB/GYN Hx: G1 current 14w  Last PAP smear: never had one    PMHx: Nutcracker Esophagus, Anemia  SHx: n/a  Meds: iron, PNV  Allergies: NKDA    Social hx: Sexually active with her , denies drinking/smoking    PHYSICAL EXAM:   Vital Signs Last 24 Hrs  T(C): 36.8 (2021 16:57), Max: 36.8 (2021 16:57)  T(F): 98.3 (2021 16:57), Max: 98.3 (2021 16:57)  HR: 77 (2021 16:57) (72 - 77)  BP: 113/73 (2021 16:57) (99/63 - 113/73)  BP(mean): --  RR: 18 (2021 16:57) (18 - 18)  SpO2: 99% (2021 16:57) (99% - 99%)    **************************  Constitutional: Alert & Oriented x3, No acute distress  Respiratory: Clear to ausculation bilaterally; no wheezing, rhonchi, or crackles  Cardiovascular: regular rate and rhythm, no murmurs, or gallops  Gastrointestinal: soft, non tender, positive bowel sounds, no rebound or guarding   Pelvic exam:   Extremities: no calf tenderness or swelling    LABS:                        10.2   7.93  )-----------( 250      ( 2021 13:38 )             32.0     -    137  |  102  |  9   ----------------------------<  79  4.3   |  22  |  0.51    Ca    9.2      2021 13:38    TPro  7.4  /  Alb  3.9  /  TBili  0.3  /  DBili  x   /  AST  22  /  ALT  15  /  AlkPhos  52        Urinalysis Basic - ( 2021 13:38 )    Color: Yellow / Appearance: Clear / S.020 / pH: x  Gluc: x / Ketone: 40 mg/dL  / Bili: Negative / Urobili: 1.0 E.U./dL   Blood: x / Protein: Trace mg/dL / Nitrite: NEGATIVE   Leuk Esterase: NEGATIVE / RBC: < 5 /HPF / WBC < 5 /HPF   Sq Epi: x / Non Sq Epi: 5-10 /HPF / Bacteria: Present /HPF      HCG Quantitative, Serum: 13501 mIU/mL ( @ 13:38)      RADIOLOGY & ADDITIONAL STUDIES: 21 yo  at 14w by LMP of 3/23 comes in complaining of generalized abdominal pain that radiates to her back since yesterday.  Pt states her pain has been constant since yesterday and nothing has relieved her pain.  Pt also admits to vomiting 5 times since last night and feeling nauseous since the pain started yesterday. Pt denies VB, abnormal discharge, dysuria, diarrhea, SOB, chest pain, dizziness, fever, chills.       OB/GYN Hx: G1 current 14w  Last PAP smear: never had one    PMHx: Nutcracker Esophagus, Anemia  SHx: n/a  Meds: iron, PNV  Allergies: NKDA    Social hx: Sexually active with her , denies drinking/smoking    PHYSICAL EXAM:   Vital Signs Last 24 Hrs  T(C): 36.8 (2021 16:57), Max: 36.8 (2021 16:57)  T(F): 98.3 (2021 16:57), Max: 98.3 (2021 16:57)  HR: 77 (2021 16:57) (72 - 77)  BP: 113/73 (2021 16:57) (99/63 - 113/73)  BP(mean): --  RR: 18 (2021 16:57) (18 - 18)  SpO2: 99% (2021 16:57) (99% - 99%)    **************************  Constitutional: Alert & Oriented x3, No acute distress  Respiratory: Clear to ausculation bilaterally; no wheezing, rhonchi, or crackles  Cardiovascular: regular rate and rhythm, no murmurs, or gallops  Gastrointestinal: soft, non tender, positive bowel sounds, no rebound or guarding   Pelvic exam:   Extremities: no calf tenderness or swelling    LABS:                        10.2   7.93  )-----------( 250      ( 2021 13:38 )             32.0     -    137  |  102  |  9   ----------------------------<  79  4.3   |  22  |  0.51    Ca    9.2      2021 13:38    TPro  7.4  /  Alb  3.9  /  TBili  0.3  /  DBili  x   /  AST  22  /  ALT  15  /  AlkPhos  52        Urinalysis Basic - ( 2021 13:38 )    Color: Yellow / Appearance: Clear / S.020 / pH: x  Gluc: x / Ketone: 40 mg/dL  / Bili: Negative / Urobili: 1.0 E.U./dL   Blood: x / Protein: Trace mg/dL / Nitrite: NEGATIVE   Leuk Esterase: NEGATIVE / RBC: < 5 /HPF / WBC < 5 /HPF   Sq Epi: x / Non Sq Epi: 5-10 /HPF / Bacteria: Present /HPF      HCG Quantitative, Serum: 78999 mIU/mL ( @ 13:38)      RADIOLOGY & ADDITIONAL STUDIES:  EXAM:  US OB TRANSVAGINAL                          EXAM:  US OB GRT THAN 14 WKS 1ST GEST                          PROCEDURE DATE:  2021          INTERPRETATION:  OBSTETRICAL ULTRASOUND - SECOND TRIMESTER dated 2021 3:48 PM    INDICATION: 20 years Female with second trimester pregnancy and abdominal pain. LMP: 3/20/2021    TECHNIQUE: Transabdominal and transvaginal views of the pelvis were obtained.    PRIOR STUDIES: None.    FINDINGS:  By dates, the estimated gestational age is 14 weeks and 3 days.    A single intrauterine gestation is visible with an average ultrasound age of 14 weeks and 2 days.    Biparietal diameter is 2.7 cm, corresponding to a gestational age of 14 weeks and 6 days.    Head circumference is 9.7 cm, corresponding to a gestational age of 14 weeks and 4 days.    Abdominal circumference is 8.0 cm, corresponding to a gestational age of 14 weeks and 3 days.    Femur length is 1.0 cm, corresponding to a gestational age of 13 weeks and 1 day.    Fetal cardiac motion is visible with fetal heart rate of 136 beats per minute.    A normal amount of amniotic fluid is evident. The placenta is located anterior. No previa.  No placenta previa is evident.  No subchorionic bleed is visible.  The cervix is closed with a length of 4.8 cm.    The right ovary is normal in size, measuring 2.6 x 1.3 x 2.1 cm with a calculated volume of 5.1 mL. No right ovarian masses are seen. The left ovary is normal in size, measuring 2.5 x 2.0 x 1.7 cm with a calculated volume of 4.4 mL. No left ovarian masses are seen. Doppler evaluation demonstrates flow to both ovaries with no evidence of torsion.    A physiologic amount of free fluid is seen in the cul-de-sac.      IMPRESSION:    Single live intrauterine gestation with an average ultrasound age of 14 weeks 2 days. Fetal heartbeat equal to 136 bpm. No reason for abdominal pain is delineated in this study. A formal fetal anatomic survey is suggested at approximately 18-20 weeks for more complete evaluation.    Please note this study was not optimized for the evaluation of fetal anatomy which should be performed on a separate basis.          Thank you for the opportunity to participate in the care of this patient.    DENNIS CASH MD; Resident Radiologist  This document has been electronically signed.  ELLEN SIMPSON MD; Attending Radiologist  This document has been electronically signed. 2021  5:31PMEXAM:  US ABDOMEN COMPLETE                          PROCEDURE DATE:  2021          INTERPRETATION:  CLINICAL INFORMATION: Evaluate gallbladder and kidney. Rule out appendicitis. Right upper quadrant abdominal pain.    COMPARISON: None available.    TECHNIQUE: Sonography of the abdomen.    FINDINGS:    Liver: Normal echogenicity. Liver size is normal, measuring 14.2 cm in craniocaudal dimension. There are no focal lesions. The visualized portions of the hepatic and portal veins demonstrate normal directionality of flow.  Bile ducts: Normal caliber. Common bile duct measures 0.5 cm.  Gallbladder: Cholelithiasis and mobile dependent gallbladder sludge. No wall thickening or pericholecystic fluid.  Pancreas: Visualized portions are within normal limits.  Spleen: 10.9 cm. Within normal limits.  Right kidney: 11.1 cm. No hydronephrosis. Normal echogenicity.  Left kidney: 10.2 cm.  No hydronephrosis. Normal echogenicity.  Ascites: None.  Aorta and IVC: Visualized portions are within normal limits.    Right lower quadrant ultrasound: A linear high-frequency transducer utilizing graded compression of the right lower quadrant was performed. Appendix is visualized and is normal in caliber and compressible. No wall thickening, hypervascularity or abnormal fluid collection.    IMPRESSION:  1.  Cholelithiasis and gallbladder sludge. No evidence of cholecystitis.  2.  Normal appendix on ultrasound examination.      Thank you for the opportunity to participate in the care of this patient.    DENNIS CASH MD; Resident Radiologist  This document has been electronically signed.  ELLEN SIMPSON MD; Attending Radiologist  This document has been electronically signed. 2021  5:38PM

## 2021-06-29 NOTE — ED PROVIDER NOTE - CLINICAL SUMMARY MEDICAL DECISION MAKING FREE TEXT BOX
19 yo F, , with PMH of Nutcracker's (discovered incidentally on CT scan), hs of UTI, 13 weeks 5 days pregnant, no complications, last u/s a week ago which was normal per pt report, [facility] not vaccinated for Covid 19, but has antibodies, no other medical problems, p/w nausea and vomiting with abd pain > RLQ since yesterday morning. Pt states that yesterday she started having nausea and vomiting with abd pain > RLQ. Took Tylenol twice over the course of the day with minimal relief. Had another 2 episodes of  nbnb emesis last night and once again at 5 am today with increasing abd pain which now wraps around to right flank. Denies any urinary sxs including dysuria, hematuria, burning or increased frequency in urination. Last BM was this morning- formed, non-bloody and normal per pt. Pt has no hx of abd surgeries. No fevers, chills, CP, SOB. No other complaints. 21 yo F, , with PMH of Nutcracker's (discovered incidentally on CT scan), hs of UTI, 13 weeks 5 days pregnant, no complications, last u/s a week ago which was normal per pt report, followed by Dr. Sears (obgyn), not vaccinated for Covid 19, but has antibodies, no other medical problems, p/w nausea and vomiting with abd pain > RLQ since yesterday morning. Pt states that yesterday she started having nausea and vomiting with abd pain > RLQ. Took Tylenol twice over the course of the day with minimal relief. Had another 2 episodes of  nbnb emesis last night and once again at 5 am today with increasing abd pain which now wraps around to right flank. Denies any urinary sxs including dysuria, hematuria, burning or increased frequency in urination. Last BM was this morning- formed, non-bloody and normal per pt. Pt has no hx of abd surgeries. No fevers, chills, CP, SOB. No other complaints.  ED course: Pt HD stable. Afebrile. Right sided abd TTP on exam. Surgery and Gyn consulted for concern for acute appendicitis. Ultrasounds done and shows viable IUG, no acute appendicitis and gallstones/ cholelithiasis. Labs noted. Pt feeling better post IVF and pain meds/ anti-emetics. Surgery admitted pt for observation. Gyn to follow.

## 2021-06-29 NOTE — CONSULT NOTE ADULT - ASSESSMENT
20  at 14w comes in complaining of abdominal pain that radiates to back along with vomiting for 1 day.   -CBC, no WBC, Hgb stable at 10.2  -No fevers, VS WNL   -US: Cholelithiasis , IUP  -Counseled pt to have a low fat diet, hydrate.    -For pain control pt may take Tylenol   -Dr Campbell in agreement with plan 20  at 14w comes in complaining of abdominal pain that radiates to back along with vomiting for 1 day.   -CBC, no WBC, Hgb stable at 10.2  -No fevers, VS WNL   -TAUS: Cholelithiasis as above TVUS: IUP as above  -Counseled pt to have a low fat diet, hydrate.    -For pain control pt may take Tylenol   -Pt may go home with pain control and f/u in office   -Surgery consulted   -counseled to come back if pain gets worse  -Dr Campbell in agreement with plan

## 2021-06-29 NOTE — H&P ADULT - NSHPPHYSICALEXAM_GEN_ALL_CORE
Vital Signs Last 24 Hrs  T(C): 36.8 (29 Jun 2021 16:57), Max: 36.8 (29 Jun 2021 16:57)  T(F): 98.3 (29 Jun 2021 16:57), Max: 98.3 (29 Jun 2021 16:57)  HR: 77 (29 Jun 2021 16:57) (72 - 77)  BP: 113/73 (29 Jun 2021 16:57) (99/63 - 113/73)  BP(mean): --  RR: 18 (29 Jun 2021 16:57) (18 - 18)  SpO2: 99% (29 Jun 2021 16:57) (99% - 99%)    General: Appears stated age, No acute distress, WD/WN  Head: NC/AT  EENT: PERRLA. EOMI. Conjunctiva and sclera clear. Pharynx clear.  Neck: Supple.  Lungs: CTA B/l. Nonlabored Respirations  CV: +S1S2, RRR  Abdomen: soft, minimally tender lower abdomen, non-distended  Extremities: Warm and well perfused. 2+ peripheral pulses b/l. Calf soft, nontender b/l. No pedal edema.  Neuro: A&Ox3.

## 2021-06-29 NOTE — ED ADULT TRIAGE NOTE - CHIEF COMPLAINT QUOTE
pt arriving to ED for c/c R lower abd pain that has been persistent since yesterday, with n/v and lightheadedness, trouble tolerating PO. , ~14wks pregnant (LMP ). Denies vaginal bleeding or d/c

## 2021-06-29 NOTE — H&P ADULT - ASSESSMENT
21 yo F 14 weeks pregnant no PMH or PSH presents with acute onset abdominal pain associated with nausea and vomiting. VSS, labs are wnl, imaging significant for cholelithiasis.     Plan:  Admit to General Surgery Team [facility], Dr. Peck for overnight observation  Low fat diet  Pain/nausea control PRN  AM labs  Serial abdominal exams  Plan discussed with chief resident and attending

## 2021-06-29 NOTE — ED PROVIDER NOTE - OBJECTIVE STATEMENT
21 yo F, , with PMH of Nutcracker's (discovered incidentally on CT scan), hs of UTI, 13 weeks 5 days pregnant, no complications, last u/s a week ago which was normal per pt report, [facility] not vaccinated for Covid 19, but has antibodies, no other medical problems, p/w nausea and vomiting with abd pain > RLQ since yesterday morning. Pt states that yesterday she started having nausea and vomiting with abd pain > RLQ. Took Tylenol twice over the course of the day with minimal relief. Had another 2 episodes of  nbnb emesis last night and once again at 5 am today with increasing abd pain which now wraps around to right flank. Denies any urinary sxs including dysuria, hematuria, burning or increased frequency in urination. Last BM was this morning- formed, non-bloody and normal per pt. Pt has no hx of abd surgeries. No fevers, chills, CP, SOB. No other complaints.

## 2021-06-29 NOTE — H&P ADULT - HISTORY OF PRESENT ILLNESS
This is a 21 yo F PMH of Nutcracker syndrome, no PSH who presents with one day history of acute onset lower abdominal pain. Pt states she began feeling pain yesterday morning associated with nausea and several episodes of emesis. She states she had diarrhea on Saturday, but has since had normal bowel function. She has had decreased PO intake, denies any fevers, chills, chest pain, shortness of breath, dysuria. She states the abdominal pain wraps around to her right flank.    In ED, pt is afebrile, HDS. Labs are unremarkable. Abdominal u/s shows normal appendix, cholelithiasis, no pericholecystic fluid or GB wall thickening.

## 2021-06-30 ENCOUNTER — TRANSCRIPTION ENCOUNTER (OUTPATIENT)
Age: 21
End: 2021-06-30

## 2021-06-30 VITALS
TEMPERATURE: 98 F | SYSTOLIC BLOOD PRESSURE: 101 MMHG | RESPIRATION RATE: 16 BRPM | DIASTOLIC BLOOD PRESSURE: 56 MMHG | HEART RATE: 70 BPM | OXYGEN SATURATION: 97 %

## 2021-06-30 LAB
ALBUMIN SERPL ELPH-MCNC: 3.5 G/DL — SIGNIFICANT CHANGE UP (ref 3.3–5)
ALP SERPL-CCNC: 47 U/L — SIGNIFICANT CHANGE UP (ref 40–120)
ALT FLD-CCNC: 13 U/L — SIGNIFICANT CHANGE UP (ref 10–45)
ANION GAP SERPL CALC-SCNC: 12 MMOL/L — SIGNIFICANT CHANGE UP (ref 5–17)
AST SERPL-CCNC: 15 U/L — SIGNIFICANT CHANGE UP (ref 10–40)
BASOPHILS # BLD AUTO: 0.03 K/UL — SIGNIFICANT CHANGE UP (ref 0–0.2)
BASOPHILS NFR BLD AUTO: 0.4 % — SIGNIFICANT CHANGE UP (ref 0–2)
BILIRUB SERPL-MCNC: 0.3 MG/DL — SIGNIFICANT CHANGE UP (ref 0.2–1.2)
BUN SERPL-MCNC: 8 MG/DL — SIGNIFICANT CHANGE UP (ref 7–23)
CALCIUM SERPL-MCNC: 8.8 MG/DL — SIGNIFICANT CHANGE UP (ref 8.4–10.5)
CHLORIDE SERPL-SCNC: 104 MMOL/L — SIGNIFICANT CHANGE UP (ref 96–108)
CO2 SERPL-SCNC: 19 MMOL/L — LOW (ref 22–31)
COVID-19 SPIKE DOMAIN AB INTERP: POSITIVE
COVID-19 SPIKE DOMAIN ANTIBODY RESULT: >250 U/ML — HIGH
CREAT SERPL-MCNC: 0.55 MG/DL — SIGNIFICANT CHANGE UP (ref 0.5–1.3)
CULTURE RESULTS: NO GROWTH — SIGNIFICANT CHANGE UP
EOSINOPHIL # BLD AUTO: 0.07 K/UL — SIGNIFICANT CHANGE UP (ref 0–0.5)
EOSINOPHIL NFR BLD AUTO: 1 % — SIGNIFICANT CHANGE UP (ref 0–6)
GLUCOSE SERPL-MCNC: 63 MG/DL — LOW (ref 70–99)
HCT VFR BLD CALC: 28 % — LOW (ref 34.5–45)
HCT VFR BLD CALC: 28.3 % — LOW (ref 34.5–45)
HGB BLD-MCNC: 8.9 G/DL — LOW (ref 11.5–15.5)
HGB BLD-MCNC: 9 G/DL — LOW (ref 11.5–15.5)
IMM GRANULOCYTES NFR BLD AUTO: 0.4 % — SIGNIFICANT CHANGE UP (ref 0–1.5)
LYMPHOCYTES # BLD AUTO: 1.27 K/UL — SIGNIFICANT CHANGE UP (ref 1–3.3)
LYMPHOCYTES # BLD AUTO: 19 % — SIGNIFICANT CHANGE UP (ref 13–44)
MAGNESIUM SERPL-MCNC: 1.7 MG/DL — SIGNIFICANT CHANGE UP (ref 1.6–2.6)
MCHC RBC-ENTMCNC: 22.6 PG — LOW (ref 27–34)
MCHC RBC-ENTMCNC: 22.7 PG — LOW (ref 27–34)
MCHC RBC-ENTMCNC: 31.4 GM/DL — LOW (ref 32–36)
MCHC RBC-ENTMCNC: 32.1 GM/DL — SIGNIFICANT CHANGE UP (ref 32–36)
MCV RBC AUTO: 70.7 FL — LOW (ref 80–100)
MCV RBC AUTO: 71.8 FL — LOW (ref 80–100)
MONOCYTES # BLD AUTO: 0.34 K/UL — SIGNIFICANT CHANGE UP (ref 0–0.9)
MONOCYTES NFR BLD AUTO: 5.1 % — SIGNIFICANT CHANGE UP (ref 2–14)
NEUTROPHILS # BLD AUTO: 4.96 K/UL — SIGNIFICANT CHANGE UP (ref 1.8–7.4)
NEUTROPHILS NFR BLD AUTO: 74.1 % — SIGNIFICANT CHANGE UP (ref 43–77)
NRBC # BLD: 0 /100 WBCS — SIGNIFICANT CHANGE UP (ref 0–0)
NRBC # BLD: 0 /100 WBCS — SIGNIFICANT CHANGE UP (ref 0–0)
PHOSPHATE SERPL-MCNC: 3.9 MG/DL — SIGNIFICANT CHANGE UP (ref 2.5–4.5)
PLATELET # BLD AUTO: 193 K/UL — SIGNIFICANT CHANGE UP (ref 150–400)
PLATELET # BLD AUTO: 199 K/UL — SIGNIFICANT CHANGE UP (ref 150–400)
POTASSIUM SERPL-MCNC: 4 MMOL/L — SIGNIFICANT CHANGE UP (ref 3.5–5.3)
POTASSIUM SERPL-SCNC: 4 MMOL/L — SIGNIFICANT CHANGE UP (ref 3.5–5.3)
PROT SERPL-MCNC: 6 G/DL — SIGNIFICANT CHANGE UP (ref 6–8.3)
RBC # BLD: 3.94 M/UL — SIGNIFICANT CHANGE UP (ref 3.8–5.2)
RBC # BLD: 3.96 M/UL — SIGNIFICANT CHANGE UP (ref 3.8–5.2)
RBC # FLD: 16.4 % — HIGH (ref 10.3–14.5)
RBC # FLD: 16.5 % — HIGH (ref 10.3–14.5)
SARS-COV-2 IGG+IGM SERPL QL IA: >250 U/ML — HIGH
SARS-COV-2 IGG+IGM SERPL QL IA: POSITIVE
SODIUM SERPL-SCNC: 135 MMOL/L — SIGNIFICANT CHANGE UP (ref 135–145)
SPECIMEN SOURCE: SIGNIFICANT CHANGE UP
WBC # BLD: 6.63 K/UL — SIGNIFICANT CHANGE UP (ref 3.8–10.5)
WBC # BLD: 6.87 K/UL — SIGNIFICANT CHANGE UP (ref 3.8–10.5)
WBC # FLD AUTO: 6.63 K/UL — SIGNIFICANT CHANGE UP (ref 3.8–10.5)
WBC # FLD AUTO: 6.87 K/UL — SIGNIFICANT CHANGE UP (ref 3.8–10.5)

## 2021-06-30 PROCEDURE — 86850 RBC ANTIBODY SCREEN: CPT

## 2021-06-30 PROCEDURE — 84100 ASSAY OF PHOSPHORUS: CPT

## 2021-06-30 PROCEDURE — U0003: CPT

## 2021-06-30 PROCEDURE — 76805 OB US >/= 14 WKS SNGL FETUS: CPT

## 2021-06-30 PROCEDURE — 84702 CHORIONIC GONADOTROPIN TEST: CPT

## 2021-06-30 PROCEDURE — 76817 TRANSVAGINAL US OBSTETRIC: CPT

## 2021-06-30 PROCEDURE — 87086 URINE CULTURE/COLONY COUNT: CPT

## 2021-06-30 PROCEDURE — 96374 THER/PROPH/DIAG INJ IV PUSH: CPT

## 2021-06-30 PROCEDURE — 80053 COMPREHEN METABOLIC PANEL: CPT

## 2021-06-30 PROCEDURE — 86769 SARS-COV-2 COVID-19 ANTIBODY: CPT

## 2021-06-30 PROCEDURE — G0378: CPT

## 2021-06-30 PROCEDURE — U0005: CPT

## 2021-06-30 PROCEDURE — 85025 COMPLETE CBC W/AUTO DIFF WBC: CPT

## 2021-06-30 PROCEDURE — 81001 URINALYSIS AUTO W/SCOPE: CPT

## 2021-06-30 PROCEDURE — 86901 BLOOD TYPING SEROLOGIC RH(D): CPT

## 2021-06-30 PROCEDURE — 83735 ASSAY OF MAGNESIUM: CPT

## 2021-06-30 PROCEDURE — 76700 US EXAM ABDOM COMPLETE: CPT

## 2021-06-30 PROCEDURE — 99285 EMERGENCY DEPT VISIT HI MDM: CPT | Mod: 25

## 2021-06-30 PROCEDURE — 36415 COLL VENOUS BLD VENIPUNCTURE: CPT

## 2021-06-30 PROCEDURE — 86900 BLOOD TYPING SEROLOGIC ABO: CPT

## 2021-06-30 PROCEDURE — 85027 COMPLETE CBC AUTOMATED: CPT

## 2021-06-30 RX ORDER — ONDANSETRON 8 MG/1
4 TABLET, FILM COATED ORAL ONCE
Refills: 0 | Status: COMPLETED | OUTPATIENT
Start: 2021-06-30 | End: 2021-06-30

## 2021-06-30 RX ORDER — MAGNESIUM SULFATE 500 MG/ML
1 VIAL (ML) INJECTION ONCE
Refills: 0 | Status: COMPLETED | OUTPATIENT
Start: 2021-06-30 | End: 2021-06-30

## 2021-06-30 RX ADMIN — ONDANSETRON 4 MILLIGRAM(S): 8 TABLET, FILM COATED ORAL at 01:38

## 2021-06-30 RX ADMIN — ONDANSETRON 4 MILLIGRAM(S): 8 TABLET, FILM COATED ORAL at 08:24

## 2021-06-30 RX ADMIN — Medication 100 GRAM(S): at 09:41

## 2021-06-30 NOTE — DISCHARGE NOTE PROVIDER - NSDCFUSCHEDAPPT_GEN_ALL_CORE_FT
[facility] ; 07/15/2021 ; NPP OB/ [address]  [facility] ; 08/18/2021 ; NPP Antepartum [address]

## 2021-06-30 NOTE — DISCHARGE NOTE PROVIDER - CARE PROVIDER_API CALL
Ziggy Peck)  Surgery  [address], [address]  [place] [address]  Phone: [phone]  Fax: [phone]  Follow Up Time:

## 2021-06-30 NOTE — PROGRESS NOTE ADULT - ASSESSMENT
19 yo F 14 weeks pregnant no PMH or PSH presents with acute onset abdominal pain associated with nausea and vomiting. VSS, labs are wnl, imaging c/f cholelithiasis.     Low fat diet  Pain/nausea control PRN  SCDs/OOBA  Serial abdominal exams  Possible dc pending clinical course and am labs

## 2021-06-30 NOTE — CHART NOTE - NSCHARTNOTEFT_GEN_A_CORE
Pt seen by GYN team last night in the ER as pt is a 19 yo  at 14w1d with cholelithiasis.  Upon evaluation in the ED Dr Campbell cleared her for discharge due to the fact pt had no pain.  Received a call this AM saying pt is admitted to surgery for overnight observation.  Pt seen and examined at bedside, pt is currently pain free and asymptomatic with no pain medication.  Pt states she no longer feels nauseous and now has an appetite. Pt given strict precautions on when to come back to the ER if she has abdominal pain again wrapping around to her flank, fever, nausea, vomiting, SOB, chest pain, VB.  Pt understands the precautions and will also will maintain a low fat diet.      Per OBGYN attending surgery if necessary is ok in the second trimester, if surgical method is necessary recommended prior to third trimester.

## 2021-06-30 NOTE — DISCHARGE NOTE PROVIDER - NSDCFUADDINST_GEN_ALL_CORE_FT
Contact your doctor or go to the ER for fever > 101.5, chills, nausea, vomiting, chest pain, shortness of breath, pain not controlled by medication or excessive bleeding.

## 2021-06-30 NOTE — DISCHARGE NOTE PROVIDER - NSDCHOSPICE_GEN_A_CORE
Call placed to patient and informed him of below. He is given central scheduling phone number to call and schedule the CT. Pt verbalized understanding.    No

## 2021-06-30 NOTE — DISCHARGE NOTE PROVIDER - HOSPITAL COURSE
21 yo F 14 weeks pregnant no PMH or PSH presents with acute onset abdominal pain associated with nausea and vomiting. VSS, labs are wnl, imaging significant for cholelithiasis. Patients symptoms improved on admission. Hospital course was uncomplicated. Diet was advanced as tolerated and pain was well controlled on medication. On day of discharge, pt deemed stable and ready to return home with plan to follow up as an outpatient.

## 2021-06-30 NOTE — PROGRESS NOTE ADULT - SUBJECTIVE AND OBJECTIVE BOX
SUBJECTIVE: Feeling better, pain controlled, no N/V. Patient seen and examined bedside by chief resident.      MEDICATIONS  (PRN):  acetaminophen 300 mG/codeine 30 mG 2 Tablet(s) Oral every 6 hours PRN Severe Pain (7 - 10)  ondansetron Injectable 4 milliGRAM(s) IV Push every 6 hours PRN Nausea      I&O's Detail    2021 07:01  -  2021 07:00  --------------------------------------------------------  IN:  Total IN: 0 mL    OUT:    Voided (mL): 0 mL  Total OUT: 0 mL    Total NET: 0 mL          Vital Signs Last 24 Hrs  T(C): 37.1 (2021 04:57), Max: 37.2 (2021 00:38)  T(F): 98.7 (2021 04:57), Max: 98.9 (2021 00:38)  HR: 78 (2021 04:57) (68 - 102)  BP: 101/49 (2021 04:57) (99/63 - 132/64)  BP(mean): --  RR: 17 (2021 04:57) (16 - 18)  SpO2: 97% (2021 04:57) (97% - 100%)    General: NAD, resting comfortably in bed  C/V: NSR  Pulm: Nonlabored breathing, no respiratory distress  Abd: soft, nondistended, very slight TTP LLQ, no rebound/guarding  Extrem: SCDs in place    LABS:                        10.2   7.93  )-----------( 250      ( 2021 13:38 )             32.0     06-29    137  |  102  |  9   ----------------------------<  79  4.3   |  22  |  0.51    Ca    9.2      2021 13:38    TPro  7.4  /  Alb  3.9  /  TBili  0.3  /  DBili  x   /  AST  22  /  ALT  15  /  AlkPhos  52  06-29      Urinalysis Basic - ( 2021 13:38 )    Color: Yellow / Appearance: Clear / S.020 / pH: x  Gluc: x / Ketone: 40 mg/dL  / Bili: Negative / Urobili: 1.0 E.U./dL   Blood: x / Protein: Trace mg/dL / Nitrite: NEGATIVE   Leuk Esterase: NEGATIVE / RBC: < 5 /HPF / WBC < 5 /HPF   Sq Epi: x / Non Sq Epi: 5-10 /HPF / Bacteria: Present /HPF

## 2021-06-30 NOTE — DISCHARGE NOTE NURSING/CASE MANAGEMENT/SOCIAL WORK - PATIENT PORTAL LINK FT
You can access the FollowMyHealth Patient Portal offered by [facility] by registering at the following website: [email or web address]. By joining [facility]’s FollowMyHealth portal, you will also be able to view your health information using other applications (apps) compatible with our system.

## 2021-06-30 NOTE — DISCHARGE NOTE PROVIDER - NSDCFUADDAPPT_GEN_ALL_CORE_FT
Please follow up with Dr. Peck; you may call the office to make an appointment at your earliest convenience.

## 2021-07-07 DIAGNOSIS — O99.012 ANEMIA COMPLICATING PREGNANCY, SECOND TRIMESTER: ICD-10-CM

## 2021-07-07 DIAGNOSIS — K80.20 CALCULUS OF GALLBLADDER WITHOUT CHOLECYSTITIS WITHOUT OBSTRUCTION: ICD-10-CM

## 2021-07-07 DIAGNOSIS — Z86.16 PERSONAL HISTORY OF COVID-19: ICD-10-CM

## 2021-07-07 DIAGNOSIS — Z3A.14 14 WEEKS GESTATION OF PREGNANCY: ICD-10-CM

## 2021-07-07 DIAGNOSIS — R76.0 RAISED ANTIBODY TITER: ICD-10-CM

## 2021-07-07 DIAGNOSIS — Z87.19 PERSONAL HISTORY OF OTHER DISEASES OF THE DIGESTIVE SYSTEM: ICD-10-CM

## 2021-07-07 DIAGNOSIS — O26.612 LIVER AND BILIARY TRACT DISORDERS IN PREGNANCY, SECOND TRIMESTER: ICD-10-CM

## 2021-07-15 ENCOUNTER — NON-APPOINTMENT (OUTPATIENT)
Age: 21
End: 2021-07-15

## 2021-07-15 ENCOUNTER — APPOINTMENT (OUTPATIENT)
Dept: OBGYN | Facility: CLINIC | Age: 21
End: 2021-07-15
Payer: MEDICAID

## 2021-07-15 PROCEDURE — 0502F SUBSEQUENT PRENATAL CARE: CPT

## 2021-07-21 LAB
1ST TRIMESTER DATA: NORMAL
2ND TRIMESTER DATA: NORMAL
AFP PNL SERPL: NORMAL
AFP SERPL-ACNC: NORMAL
AFP SERPL-ACNC: NORMAL
B-HCG FREE SERPL-MCNC: NORMAL
CLINICAL BIOCHEMIST REVIEW: NORMAL
FREE BETA HCG 1ST TRIMESTER: NORMAL
INHIBIN A SERPL-MCNC: NORMAL
NASAL BONE: PRESENT
NOTES NTD: NORMAL
NT: NORMAL
PAPP-A SERPL-ACNC: NORMAL
U ESTRIOL SERPL-SCNC: NORMAL

## 2021-08-10 ENCOUNTER — NON-APPOINTMENT (OUTPATIENT)
Age: 21
End: 2021-08-10

## 2021-08-11 ENCOUNTER — NON-APPOINTMENT (OUTPATIENT)
Age: 21
End: 2021-08-11

## 2021-08-11 ENCOUNTER — APPOINTMENT (OUTPATIENT)
Dept: OBGYN | Facility: CLINIC | Age: 21
End: 2021-08-11
Payer: MEDICAID

## 2021-08-11 VITALS — WEIGHT: 131.88 LBS | DIASTOLIC BLOOD PRESSURE: 60 MMHG | SYSTOLIC BLOOD PRESSURE: 100 MMHG

## 2021-08-11 PROCEDURE — 0502F SUBSEQUENT PRENATAL CARE: CPT

## 2021-08-18 ENCOUNTER — ASOB RESULT (OUTPATIENT)
Age: 21
End: 2021-08-18

## 2021-08-18 ENCOUNTER — APPOINTMENT (OUTPATIENT)
Dept: ANTEPARTUM | Facility: CLINIC | Age: 21
End: 2021-08-18
Payer: MEDICAID

## 2021-08-18 PROCEDURE — 76817 TRANSVAGINAL US OBSTETRIC: CPT

## 2021-08-18 PROCEDURE — 76805 OB US >/= 14 WKS SNGL FETUS: CPT

## 2021-09-02 ENCOUNTER — NON-APPOINTMENT (OUTPATIENT)
Age: 21
End: 2021-09-02

## 2021-09-02 ENCOUNTER — APPOINTMENT (OUTPATIENT)
Dept: OBGYN | Facility: CLINIC | Age: 21
End: 2021-09-02
Payer: MEDICAID

## 2021-09-02 VITALS — SYSTOLIC BLOOD PRESSURE: 120 MMHG | WEIGHT: 134 LBS | DIASTOLIC BLOOD PRESSURE: 60 MMHG

## 2021-09-02 PROCEDURE — 0502F SUBSEQUENT PRENATAL CARE: CPT

## 2021-09-18 ENCOUNTER — OUTPATIENT (OUTPATIENT)
Dept: OUTPATIENT SERVICES | Facility: HOSPITAL | Age: 21
LOS: 1 days | End: 2021-09-18
Payer: MEDICAID

## 2021-09-18 DIAGNOSIS — O26.899 OTHER SPECIFIED PREGNANCY RELATED CONDITIONS, UNSPECIFIED TRIMESTER: ICD-10-CM

## 2021-09-18 DIAGNOSIS — Z3A.00 WEEKS OF GESTATION OF PREGNANCY NOT SPECIFIED: ICD-10-CM

## 2021-09-18 LAB
ALBUMIN SERPL ELPH-MCNC: 3.4 G/DL — SIGNIFICANT CHANGE UP (ref 3.3–5)
ALP SERPL-CCNC: 48 U/L — SIGNIFICANT CHANGE UP (ref 40–120)
ALT FLD-CCNC: 13 U/L — SIGNIFICANT CHANGE UP (ref 10–45)
ANION GAP SERPL CALC-SCNC: 9 MMOL/L — SIGNIFICANT CHANGE UP (ref 5–17)
APPEARANCE UR: CLEAR — SIGNIFICANT CHANGE UP
AST SERPL-CCNC: 18 U/L — SIGNIFICANT CHANGE UP (ref 10–40)
BACTERIA # UR AUTO: PRESENT /HPF
BILIRUB SERPL-MCNC: 0.2 MG/DL — SIGNIFICANT CHANGE UP (ref 0.2–1.2)
BILIRUB UR-MCNC: NEGATIVE — SIGNIFICANT CHANGE UP
BUN SERPL-MCNC: 8 MG/DL — SIGNIFICANT CHANGE UP (ref 7–23)
CALCIUM SERPL-MCNC: 8.7 MG/DL — SIGNIFICANT CHANGE UP (ref 8.4–10.5)
CHLORIDE SERPL-SCNC: 104 MMOL/L — SIGNIFICANT CHANGE UP (ref 96–108)
CO2 SERPL-SCNC: 22 MMOL/L — SIGNIFICANT CHANGE UP (ref 22–31)
COLOR SPEC: YELLOW — SIGNIFICANT CHANGE UP
CREAT SERPL-MCNC: 0.58 MG/DL — SIGNIFICANT CHANGE UP (ref 0.5–1.3)
DIFF PNL FLD: NEGATIVE — SIGNIFICANT CHANGE UP
EPI CELLS # UR: ABNORMAL /HPF (ref 0–5)
GLUCOSE SERPL-MCNC: 81 MG/DL — SIGNIFICANT CHANGE UP (ref 70–99)
GLUCOSE UR QL: NEGATIVE — SIGNIFICANT CHANGE UP
HCT VFR BLD CALC: 26.4 % — LOW (ref 34.5–45)
HGB BLD-MCNC: 8.5 G/DL — LOW (ref 11.5–15.5)
KETONES UR-MCNC: NEGATIVE — SIGNIFICANT CHANGE UP
LEUKOCYTE ESTERASE UR-ACNC: ABNORMAL
MCHC RBC-ENTMCNC: 24.4 PG — LOW (ref 27–34)
MCHC RBC-ENTMCNC: 32.2 GM/DL — SIGNIFICANT CHANGE UP (ref 32–36)
MCV RBC AUTO: 75.9 FL — LOW (ref 80–100)
NITRITE UR-MCNC: NEGATIVE — SIGNIFICANT CHANGE UP
NRBC # BLD: 0 /100 WBCS — SIGNIFICANT CHANGE UP (ref 0–0)
PH UR: 6.5 — SIGNIFICANT CHANGE UP (ref 5–8)
PLATELET # BLD AUTO: 173 K/UL — SIGNIFICANT CHANGE UP (ref 150–400)
POTASSIUM SERPL-MCNC: 3.7 MMOL/L — SIGNIFICANT CHANGE UP (ref 3.5–5.3)
POTASSIUM SERPL-SCNC: 3.7 MMOL/L — SIGNIFICANT CHANGE UP (ref 3.5–5.3)
PROT SERPL-MCNC: 5.8 G/DL — LOW (ref 6–8.3)
PROT UR-MCNC: NEGATIVE MG/DL — SIGNIFICANT CHANGE UP
RBC # BLD: 3.48 M/UL — LOW (ref 3.8–5.2)
RBC # FLD: 15.2 % — HIGH (ref 10.3–14.5)
RBC CASTS # UR COMP ASSIST: < 5 /HPF — SIGNIFICANT CHANGE UP
SODIUM SERPL-SCNC: 135 MMOL/L — SIGNIFICANT CHANGE UP (ref 135–145)
SP GR SPEC: 1.01 — SIGNIFICANT CHANGE UP (ref 1–1.03)
UROBILINOGEN FLD QL: 0.2 E.U./DL — SIGNIFICANT CHANGE UP
WBC # BLD: 8.31 K/UL — SIGNIFICANT CHANGE UP (ref 3.8–10.5)
WBC # FLD AUTO: 8.31 K/UL — SIGNIFICANT CHANGE UP (ref 3.8–10.5)
WBC UR QL: ABNORMAL /HPF

## 2021-09-18 PROCEDURE — 36415 COLL VENOUS BLD VENIPUNCTURE: CPT

## 2021-09-18 PROCEDURE — 99214 OFFICE O/P EST MOD 30 MIN: CPT

## 2021-09-18 PROCEDURE — 81001 URINALYSIS AUTO W/SCOPE: CPT

## 2021-09-18 PROCEDURE — 85027 COMPLETE CBC AUTOMATED: CPT

## 2021-09-18 PROCEDURE — 80053 COMPREHEN METABOLIC PANEL: CPT

## 2021-09-18 RX ORDER — ONDANSETRON 8 MG/1
8 TABLET, FILM COATED ORAL ONCE
Refills: 0 | Status: DISCONTINUED | OUTPATIENT
Start: 2021-09-18 | End: 2021-10-02

## 2021-09-18 RX ORDER — ACETAMINOPHEN 500 MG
975 TABLET ORAL ONCE
Refills: 0 | Status: COMPLETED | OUTPATIENT
Start: 2021-09-18 | End: 2021-09-18

## 2021-09-18 RX ADMIN — Medication 975 MILLIGRAM(S): at 03:04

## 2021-09-18 RX ADMIN — Medication 975 MILLIGRAM(S): at 05:11

## 2021-09-23 ENCOUNTER — APPOINTMENT (OUTPATIENT)
Dept: OBGYN | Facility: CLINIC | Age: 21
End: 2021-09-23
Payer: MEDICAID

## 2021-09-23 VITALS
OXYGEN SATURATION: 99 % | DIASTOLIC BLOOD PRESSURE: 70 MMHG | BODY MASS INDEX: 21.41 KG/M2 | SYSTOLIC BLOOD PRESSURE: 118 MMHG | HEIGHT: 68 IN | WEIGHT: 141.25 LBS

## 2021-09-23 PROCEDURE — 0502F SUBSEQUENT PRENATAL CARE: CPT

## 2021-09-24 LAB
BASOPHILS # BLD AUTO: 0.01 K/UL
BASOPHILS NFR BLD AUTO: 0.1 %
EOSINOPHIL # BLD AUTO: 0.04 K/UL
EOSINOPHIL NFR BLD AUTO: 0.5 %
GLUCOSE 1H P 50 G GLC PO SERPL-MCNC: 90 MG/DL
HCT VFR BLD CALC: 32 %
HGB BLD-MCNC: 9.7 G/DL
IMM GRANULOCYTES NFR BLD AUTO: 1.1 %
LYMPHOCYTES # BLD AUTO: 1.15 K/UL
LYMPHOCYTES NFR BLD AUTO: 13.2 %
MAN DIFF?: NORMAL
MCHC RBC-ENTMCNC: 24.7 PG
MCHC RBC-ENTMCNC: 30.3 GM/DL
MCV RBC AUTO: 81.6 FL
MONOCYTES # BLD AUTO: 0.31 K/UL
MONOCYTES NFR BLD AUTO: 3.6 %
NEUTROPHILS # BLD AUTO: 7.09 K/UL
NEUTROPHILS NFR BLD AUTO: 81.5 %
PLATELET # BLD AUTO: 188 K/UL
RBC # BLD: 3.92 M/UL
RBC # FLD: 16.3 %
T PALLIDUM AB SER QL IA: NEGATIVE
WBC # FLD AUTO: 8.7 K/UL

## 2021-09-27 LAB — BACTERIA UR CULT: NORMAL

## 2021-10-13 ENCOUNTER — APPOINTMENT (OUTPATIENT)
Dept: OBGYN | Facility: CLINIC | Age: 21
End: 2021-10-13
Payer: MEDICAID

## 2021-10-13 VITALS — DIASTOLIC BLOOD PRESSURE: 80 MMHG | SYSTOLIC BLOOD PRESSURE: 120 MMHG | WEIGHT: 150.75 LBS

## 2021-10-13 PROCEDURE — 0502F SUBSEQUENT PRENATAL CARE: CPT

## 2021-10-28 ENCOUNTER — APPOINTMENT (OUTPATIENT)
Dept: OBGYN | Facility: CLINIC | Age: 21
End: 2021-10-28
Payer: MEDICAID

## 2021-10-28 VITALS — WEIGHT: 150 LBS | SYSTOLIC BLOOD PRESSURE: 110 MMHG | DIASTOLIC BLOOD PRESSURE: 60 MMHG

## 2021-10-28 PROCEDURE — 0502F SUBSEQUENT PRENATAL CARE: CPT

## 2021-10-30 LAB
BASOPHILS # BLD AUTO: 0.02 K/UL
BASOPHILS NFR BLD AUTO: 0.2 %
EOSINOPHIL # BLD AUTO: 0.07 K/UL
EOSINOPHIL NFR BLD AUTO: 0.7 %
HCT VFR BLD CALC: 30.5 %
HGB BLD-MCNC: 9.2 G/DL
IMM GRANULOCYTES NFR BLD AUTO: 1.4 %
LYMPHOCYTES # BLD AUTO: 1.35 K/UL
LYMPHOCYTES NFR BLD AUTO: 14.2 %
MAN DIFF?: NORMAL
MCHC RBC-ENTMCNC: 24.4 PG
MCHC RBC-ENTMCNC: 30.2 GM/DL
MCV RBC AUTO: 80.9 FL
MONOCYTES # BLD AUTO: 0.32 K/UL
MONOCYTES NFR BLD AUTO: 3.4 %
NEUTROPHILS # BLD AUTO: 7.62 K/UL
NEUTROPHILS NFR BLD AUTO: 80.1 %
PLATELET # BLD AUTO: 180 K/UL
RBC # BLD: 3.77 M/UL
RBC # FLD: 15 %
WBC # FLD AUTO: 9.51 K/UL

## 2021-11-02 ENCOUNTER — APPOINTMENT (OUTPATIENT)
Dept: HEMATOLOGY ONCOLOGY | Facility: CLINIC | Age: 21
End: 2021-11-02

## 2021-11-08 ENCOUNTER — APPOINTMENT (OUTPATIENT)
Dept: HEMATOLOGY ONCOLOGY | Facility: CLINIC | Age: 21
End: 2021-11-08
Payer: MEDICAID

## 2021-11-08 ENCOUNTER — NON-APPOINTMENT (OUTPATIENT)
Age: 21
End: 2021-11-08

## 2021-11-08 ENCOUNTER — ASOB RESULT (OUTPATIENT)
Age: 21
End: 2021-11-08

## 2021-11-08 ENCOUNTER — APPOINTMENT (OUTPATIENT)
Dept: ANTEPARTUM | Facility: CLINIC | Age: 21
End: 2021-11-08
Payer: MEDICAID

## 2021-11-08 ENCOUNTER — APPOINTMENT (OUTPATIENT)
Dept: OBGYN | Facility: CLINIC | Age: 21
End: 2021-11-08
Payer: MEDICAID

## 2021-11-08 VITALS
TEMPERATURE: 97.5 F | HEART RATE: 89 BPM | HEIGHT: 69 IN | DIASTOLIC BLOOD PRESSURE: 68 MMHG | BODY MASS INDEX: 22.66 KG/M2 | SYSTOLIC BLOOD PRESSURE: 114 MMHG | OXYGEN SATURATION: 98 % | WEIGHT: 153 LBS

## 2021-11-08 VITALS
WEIGHT: 155.63 LBS | BODY MASS INDEX: 23.59 KG/M2 | SYSTOLIC BLOOD PRESSURE: 120 MMHG | HEIGHT: 68 IN | DIASTOLIC BLOOD PRESSURE: 80 MMHG

## 2021-11-08 DIAGNOSIS — D56.3 THALASSEMIA MINOR: ICD-10-CM

## 2021-11-08 DIAGNOSIS — Z80.8 FAMILY HISTORY OF MALIGNANT NEOPLASM OF OTHER ORGANS OR SYSTEMS: ICD-10-CM

## 2021-11-08 PROCEDURE — 99204 OFFICE O/P NEW MOD 45 MIN: CPT | Mod: 25

## 2021-11-08 PROCEDURE — 76819 FETAL BIOPHYS PROFIL W/O NST: CPT

## 2021-11-08 PROCEDURE — 76816 OB US FOLLOW-UP PER FETUS: CPT

## 2021-11-08 PROCEDURE — 36415 COLL VENOUS BLD VENIPUNCTURE: CPT

## 2021-11-08 PROCEDURE — 0502F SUBSEQUENT PRENATAL CARE: CPT

## 2021-11-08 RX ORDER — ACETAMINOPHEN 325 MG/1
TABLET, FILM COATED ORAL
Refills: 0 | Status: ACTIVE | COMMUNITY

## 2021-11-08 RX ORDER — IBUPROFEN 400 MG
400 TABLET ORAL
Refills: 0 | Status: COMPLETED | COMMUNITY
End: 2021-11-08

## 2021-11-09 ENCOUNTER — TRANSCRIPTION ENCOUNTER (OUTPATIENT)
Age: 21
End: 2021-11-09

## 2021-11-09 LAB
25(OH)D3 SERPL-MCNC: 35.2 NG/ML
BASOPHILS # BLD AUTO: 0.03 K/UL
BASOPHILS NFR BLD AUTO: 0.3 %
EOSINOPHIL # BLD AUTO: 0.06 K/UL
EOSINOPHIL NFR BLD AUTO: 0.5 %
ERYTHROCYTE [SEDIMENTATION RATE] IN BLOOD BY WESTERGREN METHOD: 33 MM/HR
FERRITIN SERPL-MCNC: 17 NG/ML
HAPTOGLOB SERPL-MCNC: 32 MG/DL
HCT VFR BLD CALC: 30.3 %
HGB BLD-MCNC: 9.7 G/DL
IMM GRANULOCYTES NFR BLD AUTO: 1.8 %
IRON SATN MFR SERPL: 26 %
IRON SERPL-MCNC: 91 UG/DL
LDH SERPL-CCNC: 168 U/L
LYMPHOCYTES # BLD AUTO: 1.39 K/UL
LYMPHOCYTES NFR BLD AUTO: 12.2 %
MAN DIFF?: NORMAL
MCHC RBC-ENTMCNC: 25.1 PG
MCHC RBC-ENTMCNC: 32 GM/DL
MCV RBC AUTO: 78.3 FL
MONOCYTES # BLD AUTO: 0.46 K/UL
MONOCYTES NFR BLD AUTO: 4 %
NEUTROPHILS # BLD AUTO: 9.23 K/UL
NEUTROPHILS NFR BLD AUTO: 81.2 %
PLATELET # BLD AUTO: 175 K/UL
RBC # BLD: 3.87 M/UL
RBC # FLD: 14.6 %
TIBC SERPL-MCNC: 356 UG/DL
TSH SERPL-ACNC: 0.99 UIU/ML
UIBC SERPL-MCNC: 265 UG/DL
VIT B12 SERPL-MCNC: 392 PG/ML
WBC # FLD AUTO: 11.38 K/UL

## 2021-11-10 ENCOUNTER — NON-APPOINTMENT (OUTPATIENT)
Age: 21
End: 2021-11-10

## 2021-11-10 ENCOUNTER — TRANSCRIPTION ENCOUNTER (OUTPATIENT)
Age: 21
End: 2021-11-10

## 2021-11-10 LAB — BACTERIA UR CULT: NORMAL

## 2021-11-10 NOTE — ASSESSMENT
[FreeTextEntry1] : Repeat blood work done including iron stores...\par \par Pt's Ferritin is low, but since she has Thalassemia , I would NOT recommend IV Iron...Pt to continue PO Iron replacement.\par \par I educated  patient about beta thalassemia minor  in view of the fact that anemia is familial... \par \par Patient instructed to create her portal and I will communicate with her the results once they are available.\par \par FU here PRN.\par

## 2021-11-10 NOTE — CONSULT LETTER
[Dear  ___] : Dear  [unfilled], [Consult Letter:] : I had the pleasure of evaluating your patient, [unfilled]. [Please see my note below.] : Please see my note below. [Sincerely,] : Sincerely, [Consult Closing:] : Thank you very much for allowing me to participate in the care of this patient.  If you have any questions, please do not hesitate to contact me. [DrBishop  ___] : Dr. HERNANDEZ [FreeTextEntry3] : Mira Silva MD\par

## 2021-11-10 NOTE — HISTORY OF PRESENT ILLNESS
[de-identified] : 20 y/o [employer] female...found to have anemia of pregnancy...given extra Iron without documentation of iron deficiency state... 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 She is from a [place] family and states both her mother and her brothers have anemia and they all take p.o. iron...

## 2021-11-14 ENCOUNTER — NON-APPOINTMENT (OUTPATIENT)
Age: 21
End: 2021-11-14

## 2021-11-17 ENCOUNTER — NON-APPOINTMENT (OUTPATIENT)
Age: 21
End: 2021-11-17

## 2021-11-19 ENCOUNTER — APPOINTMENT (OUTPATIENT)
Dept: OBGYN | Facility: CLINIC | Age: 21
End: 2021-11-19
Payer: MEDICAID

## 2021-11-19 PROCEDURE — 0503F POSTPARTUM CARE VISIT: CPT

## 2021-12-02 ENCOUNTER — APPOINTMENT (OUTPATIENT)
Dept: OBGYN | Facility: CLINIC | Age: 21
End: 2021-12-02
Payer: MEDICAID

## 2021-12-02 ENCOUNTER — APPOINTMENT (OUTPATIENT)
Dept: ANTEPARTUM | Facility: CLINIC | Age: 21
End: 2021-12-02
Payer: MEDICAID

## 2021-12-02 ENCOUNTER — ASOB RESULT (OUTPATIENT)
Age: 21
End: 2021-12-02

## 2021-12-02 VITALS — WEIGHT: 161.27 LBS | SYSTOLIC BLOOD PRESSURE: 124 MMHG | DIASTOLIC BLOOD PRESSURE: 80 MMHG

## 2021-12-02 PROCEDURE — 76819 FETAL BIOPHYS PROFIL W/O NST: CPT

## 2021-12-02 PROCEDURE — 0502F SUBSEQUENT PRENATAL CARE: CPT

## 2021-12-02 PROCEDURE — 76816 OB US FOLLOW-UP PER FETUS: CPT

## 2021-12-06 LAB — B-HEM STREP SPEC QL CULT: NORMAL

## 2021-12-08 ENCOUNTER — NON-APPOINTMENT (OUTPATIENT)
Age: 21
End: 2021-12-08

## 2021-12-09 ENCOUNTER — NON-APPOINTMENT (OUTPATIENT)
Age: 21
End: 2021-12-09

## 2021-12-09 ENCOUNTER — APPOINTMENT (OUTPATIENT)
Dept: OBGYN | Facility: CLINIC | Age: 21
End: 2021-12-09
Payer: MEDICAID

## 2021-12-09 VITALS — WEIGHT: 164 LBS | SYSTOLIC BLOOD PRESSURE: 120 MMHG | DIASTOLIC BLOOD PRESSURE: 70 MMHG

## 2021-12-09 PROCEDURE — 0502F SUBSEQUENT PRENATAL CARE: CPT

## 2021-12-09 RX ORDER — VALACYCLOVIR 500 MG/1
500 TABLET, FILM COATED ORAL
Qty: 60 | Refills: 2 | Status: ACTIVE | COMMUNITY
Start: 2021-12-09 | End: 1900-01-01

## 2021-12-14 ENCOUNTER — NON-APPOINTMENT (OUTPATIENT)
Age: 21
End: 2021-12-14

## 2021-12-14 ENCOUNTER — OUTPATIENT (OUTPATIENT)
Dept: OUTPATIENT SERVICES | Facility: HOSPITAL | Age: 21
LOS: 1 days | End: 2021-12-14
Payer: MEDICAID

## 2021-12-14 DIAGNOSIS — Z3A.00 WEEKS OF GESTATION OF PREGNANCY NOT SPECIFIED: ICD-10-CM

## 2021-12-14 DIAGNOSIS — O26.899 OTHER SPECIFIED PREGNANCY RELATED CONDITIONS, UNSPECIFIED TRIMESTER: ICD-10-CM

## 2021-12-14 PROCEDURE — 99214 OFFICE O/P EST MOD 30 MIN: CPT

## 2021-12-16 ENCOUNTER — NON-APPOINTMENT (OUTPATIENT)
Age: 21
End: 2021-12-16

## 2021-12-16 ENCOUNTER — APPOINTMENT (OUTPATIENT)
Dept: OBGYN | Facility: CLINIC | Age: 21
End: 2021-12-16
Payer: MEDICAID

## 2021-12-16 VITALS
HEIGHT: 69 IN | SYSTOLIC BLOOD PRESSURE: 120 MMHG | DIASTOLIC BLOOD PRESSURE: 70 MMHG | WEIGHT: 164.25 LBS | BODY MASS INDEX: 24.33 KG/M2

## 2021-12-16 PROCEDURE — 0502F SUBSEQUENT PRENATAL CARE: CPT

## 2021-12-23 ENCOUNTER — APPOINTMENT (OUTPATIENT)
Dept: OBGYN | Facility: CLINIC | Age: 21
End: 2021-12-23
Payer: MEDICAID

## 2021-12-23 ENCOUNTER — ASOB RESULT (OUTPATIENT)
Age: 21
End: 2021-12-23

## 2021-12-23 ENCOUNTER — APPOINTMENT (OUTPATIENT)
Dept: ANTEPARTUM | Facility: CLINIC | Age: 21
End: 2021-12-23
Payer: MEDICAID

## 2021-12-23 VITALS
RESPIRATION RATE: 86 BRPM | WEIGHT: 164.63 LBS | OXYGEN SATURATION: 99 % | DIASTOLIC BLOOD PRESSURE: 70 MMHG | HEIGHT: 69 IN | BODY MASS INDEX: 24.38 KG/M2 | SYSTOLIC BLOOD PRESSURE: 110 MMHG

## 2021-12-23 PROCEDURE — 76816 OB US FOLLOW-UP PER FETUS: CPT

## 2021-12-23 PROCEDURE — 76819 FETAL BIOPHYS PROFIL W/O NST: CPT

## 2021-12-23 PROCEDURE — 0502F SUBSEQUENT PRENATAL CARE: CPT

## 2021-12-28 ENCOUNTER — NON-APPOINTMENT (OUTPATIENT)
Age: 21
End: 2021-12-28

## 2021-12-28 ENCOUNTER — APPOINTMENT (OUTPATIENT)
Dept: OBGYN | Facility: CLINIC | Age: 21
End: 2021-12-28
Payer: MEDICAID

## 2021-12-28 VITALS — SYSTOLIC BLOOD PRESSURE: 113 MMHG | DIASTOLIC BLOOD PRESSURE: 60 MMHG | WEIGHT: 165 LBS

## 2021-12-28 PROCEDURE — 0502F SUBSEQUENT PRENATAL CARE: CPT

## 2021-12-28 PROCEDURE — 59025 FETAL NON-STRESS TEST: CPT

## 2021-12-29 ENCOUNTER — INPATIENT (INPATIENT)
Facility: HOSPITAL | Age: 21
LOS: 1 days | Discharge: HOME | End: 2021-12-31
Attending: OBSTETRICS & GYNECOLOGY | Admitting: OBSTETRICS & GYNECOLOGY
Payer: MEDICAID

## 2021-12-29 VITALS — WEIGHT: 164.02 LBS | HEIGHT: 69 IN

## 2021-12-29 DIAGNOSIS — O26.899 OTHER SPECIFIED PREGNANCY RELATED CONDITIONS, UNSPECIFIED TRIMESTER: ICD-10-CM

## 2021-12-29 DIAGNOSIS — Z3A.00 WEEKS OF GESTATION OF PREGNANCY NOT SPECIFIED: ICD-10-CM

## 2021-12-29 LAB
BASOPHILS # BLD AUTO: 0.02 K/UL — SIGNIFICANT CHANGE UP (ref 0–0.2)
BASOPHILS NFR BLD AUTO: 0.2 % — SIGNIFICANT CHANGE UP (ref 0–2)
BLD GP AB SCN SERPL QL: NEGATIVE — SIGNIFICANT CHANGE UP
EOSINOPHIL # BLD AUTO: 0.04 K/UL — SIGNIFICANT CHANGE UP (ref 0–0.5)
EOSINOPHIL NFR BLD AUTO: 0.4 % — SIGNIFICANT CHANGE UP (ref 0–6)
HCT VFR BLD CALC: 30.8 % — LOW (ref 34.5–45)
HGB BLD-MCNC: 10.3 G/DL — LOW (ref 11.5–15.5)
IMM GRANULOCYTES NFR BLD AUTO: 0.7 % — SIGNIFICANT CHANGE UP (ref 0–1.5)
LYMPHOCYTES # BLD AUTO: 2.06 K/UL — SIGNIFICANT CHANGE UP (ref 1–3.3)
LYMPHOCYTES # BLD AUTO: 23.1 % — SIGNIFICANT CHANGE UP (ref 13–44)
MCHC RBC-ENTMCNC: 25.6 PG — LOW (ref 27–34)
MCHC RBC-ENTMCNC: 33.4 GM/DL — SIGNIFICANT CHANGE UP (ref 32–36)
MCV RBC AUTO: 76.4 FL — LOW (ref 80–100)
MONOCYTES # BLD AUTO: 0.35 K/UL — SIGNIFICANT CHANGE UP (ref 0–0.9)
MONOCYTES NFR BLD AUTO: 3.9 % — SIGNIFICANT CHANGE UP (ref 2–14)
NEUTROPHILS # BLD AUTO: 6.39 K/UL — SIGNIFICANT CHANGE UP (ref 1.8–7.4)
NEUTROPHILS NFR BLD AUTO: 71.7 % — SIGNIFICANT CHANGE UP (ref 43–77)
NRBC # BLD: 0 /100 WBCS — SIGNIFICANT CHANGE UP (ref 0–0)
PLATELET # BLD AUTO: 158 K/UL — SIGNIFICANT CHANGE UP (ref 150–400)
RBC # BLD: 4.03 M/UL — SIGNIFICANT CHANGE UP (ref 3.8–5.2)
RBC # FLD: 14.1 % — SIGNIFICANT CHANGE UP (ref 10.3–14.5)
RH IG SCN BLD-IMP: POSITIVE — SIGNIFICANT CHANGE UP
WBC # BLD: 8.92 K/UL — SIGNIFICANT CHANGE UP (ref 3.8–10.5)
WBC # FLD AUTO: 8.92 K/UL — SIGNIFICANT CHANGE UP (ref 3.8–10.5)

## 2021-12-29 RX ORDER — CITRIC ACID/SODIUM CITRATE 300-500 MG
15 SOLUTION, ORAL ORAL EVERY 6 HOURS
Refills: 0 | Status: DISCONTINUED | OUTPATIENT
Start: 2021-12-29 | End: 2021-12-30

## 2021-12-29 RX ORDER — OXYTOCIN 10 UNIT/ML
333.33 VIAL (ML) INJECTION
Qty: 20 | Refills: 0 | Status: DISCONTINUED | OUTPATIENT
Start: 2021-12-29 | End: 2021-12-30

## 2021-12-29 RX ORDER — FENTANYL/BUPIVACAINE/NS/PF 2MCG/ML-.1
250 PLASTIC BAG, INJECTION (ML) INJECTION
Refills: 0 | Status: DISCONTINUED | OUTPATIENT
Start: 2021-12-29 | End: 2021-12-31

## 2021-12-29 RX ORDER — SODIUM CHLORIDE 9 MG/ML
1000 INJECTION, SOLUTION INTRAVENOUS
Refills: 0 | Status: DISCONTINUED | OUTPATIENT
Start: 2021-12-29 | End: 2021-12-30

## 2021-12-29 NOTE — PATIENT PROFILE OB - NS PRO AD PATIENT TYPE
SUBJECTIVE:   Rupinder Johnson is a 31 year old female    for annual well woman exam.   Patient's last menstrual period was 09/15/2019 (exact date).     Menstrual history: regular   GYN History:Current Contraception: ocps , Pap History:normal   Date: , Sexual History: active and Sexually Transmitted Infections:  No   Date of last mammogram: never   Result of mammogram: never     Date of DEXA: never   Result of DEXA Scan: never     Date of last Colonoscopy:never    Result of Colonoscopy: never       Does patient exercise? No   How many times per week? None     Was counseling given: yes     Does patient desire TDAP vaccine today: no     Domestic Violence Screening:  Do you feel safe at home and in your current relationship?  Yes       Depression Screening:  Over the past 2 weeks, has patient felt down, depressed or hopeless? No   Over the past 2 weeks, has patient felt little interest or pleasure in doing things? No     On the basis of the above screen, the following is initiated:  None     Social History:   Social History     Socioeconomic History   • Marital status: /Civil Union     Spouse name: Not on file   • Number of children: Not on file   • Years of education: Not on file   • Highest education level: Not on file   Occupational History   • Not on file   Social Needs   • Financial resource strain: Not on file   • Food insecurity:     Worry: Not on file     Inability: Not on file   • Transportation needs:     Medical: Not on file     Non-medical: Not on file   Tobacco Use   • Smoking status: Never Smoker   • Smokeless tobacco: Never Used   Substance and Sexual Activity   • Alcohol use: Not Currently   • Drug use: Not Currently   • Sexual activity: Yes     Partners: Male     Birth control/protection: Pill   Lifestyle   • Physical activity:     Days per week: Not on file     Minutes per session: Not on file   • Stress: Not on file   Relationships   • Social connections:     Talks on phone: Not on  file     Gets together: Not on file     Attends [employer] service: Not on file     Active member of club or organization: Not on file     Attends meetings of clubs or organizations: Not on file     Relationship status: Not on file   • Intimate partner violence:     Fear of current or ex partner: Not on file     Emotionally abused: Not on file     Physically abused: Not on file     Forced sexual activity: Not on file   Other Topics Concern   • Not on file   Social History Narrative   • Not on file     Past Medical History:   Diagnosis Date   • Abnormal Pap smear of cervix 2009   • Anxiety    • Depression    • Migraines      Past Surgical History:   Procedure Laterality Date   • Colposcopy  2009   • [place] tooth extraction       Family History:   Family History   Problem Relation Age of Onset   • Anxiety disorder Mother    • Depression Mother    • Other Mother         Fibromyalgia    • Cancer Father    • Hypertension Father    • Anxiety disorder Father    • Depression Father    • Cancer, Breast Maternal Grandmother    • Anxiety disorder Maternal Grandmother    • Depression Maternal Grandmother    • Cancer, Lung Maternal Grandfather    • Cancer, Breast Paternal Grandmother    • Cancer, Throat Paternal Grandfather        Allergies:   ALLERGIES:   Allergen Reactions   • Erythromycin Other (See Comments)     ABDOMINAL PAIN        Current Outpatient Medications   Medication Sig Dispense Refill   • gabapentin (NEURONTIN) 100 MG capsule TAKE 1 TO 2 CAPSULES BY MOUTH EVERY NIGHT AS NEEDED 30 capsule 0   • sertraline (ZOLOFT) 25 MG tablet Take 1 tablet by mouth daily. 90 tablet 1   • desogestrel-ethinyl estradiol (APRI) 0.15-30 MG-MCG per tablet Take 1 Tab by mouth daily. - Oral     • PRENATAL MULTIVIT-MIN-FE-FA PO Take  by mouth. - Oral       No current facility-administered medications for this visit.          ROS   No headaches, no unexplained chest pain, dyspnea, SOB, abdominal pain, bowel or bladder complaints.  All other  systems reviewed are negative.    GYNE ROS:   Genitourinary: denies urgency, frequency, dysuria, incontinenceVaginal symptoms: none  Discharge described as: normal and physiologic.  Other associated symptoms: no    All other systems reviewed are negative    PREVENTATIVE MEDICINE:    Does patient desire Immunizations: no  Last Lipids: No results found for: LDLHDL    OBJECTIVE  Physical Exam  Vitals:    10/14/19 1635   BP: 124/70       Rupinder's BMI is Body mass index is 34.78 kg/m²., which is Abnormal       CONSTITUTIONAL:    Appearance: well-nourished, well developed, alert, in no acute distress    HENT   Head: normocephalic, atraumatic   Face: face within normal limits, no sinus tenderness on palpation, no hirsutism present, parotid glands within normal limits   Ears: external ears within normal limits   Nose: external nose normal in appearance, nares patent, nasal discharge absent   Mouth: appearance normal    NECK    Thyroid: gland size normal, nontender, no nodules or masses present on palpation    CHEST   Respiratory effort: breathing unlabored   Auscultation: normal breath sounds, no rales, no rhonchi    CARDIOVASCULAR   Heart: regular rate, normal rhythm, no murmurs present    BREASTS   Inspection of Breasts: breasts symmetrical, no skin changes, no discharge present   Palpation of Breasts and Axillae: no masses present on palpation, no breast tenderness   Axillary Lymph Nodes: no lymphadenopathy present    GASTROINTESTINAL   Abdominal Examination: abdomen nontender to palpation, normal bowel sounds, tone normal without rigidity or guarding, no masses present, umbilicus without lesions   Liver and Spleen: no hepatomegaly present, liver nontender to palpation   Hernias: no hernias present    GENITOURINARY   External Genitalia: normal appearance for age, no discharge present, no tenderness present, no inflammatory lesions present   Vagina: normal vaginal vault without central or paravaginal defects, no discharge  present, no inflammatory lesions present, no masses present   Bladder: nontender to palpation   Urethral body: urethra palpation normal, urethra structural support normal   Cervix: appearance healthy, no lesions present, nontender to palpation, no bleeding present   Uterus: nontender to palpation, no masses present, position midline/midplane, mobility: normal   Adnexa: no adnexal tenderness present, no adnexal masses present   Perineum: perineum within normal limits, no evidence of trauma, no rashes or skin lesions present   Anus: anus within normal limits, no hemorrhoids present   Inguinal Lymph Nodes: no lymphadenopathy present      LYMPHATIC   Lymph Nodes: no other lymphadenopathy present    SKIN   General Inspection: no rashes present, no lesions present, no areas of discoloration    NEUROLOGIC/PSYCHIATRIC   Orientation: grossly oriented to person, place and time   Judgment and Insight: judgment and insight intact   Mood and Affect: mood normal, affect appropriate    ASSESSMENT/PLAN    - Health Care Maintenance: Pap smear obtained - no, mammogram ordered -noscreening gc/chlamydia obtained - no  - Discussed calcium and vitamin D intake to prevent osteoporosis.   Recommended calcium fortified diet of at least 3 servings a day, Cardiovascular health being followed by PCP, Condom use and safe sex discussed, Counseled about weight loss, Dexa scan not indicated, Low cholesterol diet, , Recommended mammograms yearly, Return for annual GYN exam in one year or earlier with any additional concerns.  and RTC in one year or sooner as needed.   - condom use safe sex counseling done  - Contraception: ocp changed to lo estrin fe 1/24 because she states her periods are heavy with current pill   - psych: pt seeing a therapist. No SI or HI  - RTC in one year or sooner as needed.        Patient repeated all of the instructions and states she understands the plan of care.  Yuko Meeks MD           Health Care Proxy (HCP)

## 2021-12-29 NOTE — PATIENT PROFILE OB - POST PARTUM DEPRESSION SCREEN OB 2
-c/w Eliquis 2.5mg BID, Procardia 90mg daily, metoprolol 100mg BID    HTN  -holding ARB given DONNA and positive orthostatics
no

## 2021-12-29 NOTE — PATIENT PROFILE OB - FALL HARM RISK - UNIVERSAL INTERVENTIONS
Bed in lowest position, wheels locked, appropriate side rails in place/Call bell, personal items and telephone in reach/Instruct patient to call for assistance before getting out of bed or chair/Non-slip footwear when patient is out of bed/[place] to call system/Physically safe environment - no spills, clutter or unnecessary equipment/Purposeful Proactive Rounding/Room/bathroom lighting operational, light cord in reach

## 2021-12-30 LAB
ALBUMIN SERPL ELPH-MCNC: 3.9 G/DL — SIGNIFICANT CHANGE UP (ref 3.3–5)
ALP SERPL-CCNC: 140 U/L — HIGH (ref 40–120)
ALT FLD-CCNC: 15 U/L — SIGNIFICANT CHANGE UP (ref 10–45)
ANION GAP SERPL CALC-SCNC: 12 MMOL/L — SIGNIFICANT CHANGE UP (ref 5–17)
AST SERPL-CCNC: 24 U/L — SIGNIFICANT CHANGE UP (ref 10–40)
BILIRUB SERPL-MCNC: 0.2 MG/DL — SIGNIFICANT CHANGE UP (ref 0.2–1.2)
BLD GP AB SCN SERPL QL: NEGATIVE — SIGNIFICANT CHANGE UP
BUN SERPL-MCNC: 13 MG/DL — SIGNIFICANT CHANGE UP (ref 7–23)
CALCIUM SERPL-MCNC: 9.3 MG/DL — SIGNIFICANT CHANGE UP (ref 8.4–10.5)
CHLORIDE SERPL-SCNC: 106 MMOL/L — SIGNIFICANT CHANGE UP (ref 96–108)
CO2 SERPL-SCNC: 21 MMOL/L — LOW (ref 22–31)
COVID-19 SPIKE DOMAIN AB INTERP: POSITIVE
COVID-19 SPIKE DOMAIN ANTIBODY RESULT: >250 U/ML — HIGH
CREAT ?TM UR-MCNC: 117 MG/DL — SIGNIFICANT CHANGE UP
CREAT SERPL-MCNC: 0.51 MG/DL — SIGNIFICANT CHANGE UP (ref 0.5–1.3)
FIBRINOGEN PPP-MCNC: 215 MG/DL — LOW (ref 258–438)
FIBRINOGEN PPP-MCNC: 271 MG/DL — SIGNIFICANT CHANGE UP (ref 258–438)
GLUCOSE BLDC GLUCOMTR-MCNC: 82 MG/DL — SIGNIFICANT CHANGE UP (ref 70–99)
GLUCOSE SERPL-MCNC: 50 MG/DL — CRITICAL LOW (ref 70–99)
HCT VFR BLD CALC: 23.3 % — LOW (ref 34.5–45)
HGB BLD-MCNC: 7.4 G/DL — LOW (ref 11.5–15.5)
LDH SERPL L TO P-CCNC: 216 U/L — SIGNIFICANT CHANGE UP (ref 50–242)
MCHC RBC-ENTMCNC: 24.7 PG — LOW (ref 27–34)
MCHC RBC-ENTMCNC: 31.8 GM/DL — LOW (ref 32–36)
MCV RBC AUTO: 77.9 FL — LOW (ref 80–100)
NRBC # BLD: 0 /100 WBCS — SIGNIFICANT CHANGE UP (ref 0–0)
PLATELET # BLD AUTO: 121 K/UL — LOW (ref 150–400)
POTASSIUM SERPL-MCNC: 4.5 MMOL/L — SIGNIFICANT CHANGE UP (ref 3.5–5.3)
POTASSIUM SERPL-SCNC: 4.5 MMOL/L — SIGNIFICANT CHANGE UP (ref 3.5–5.3)
PROT ?TM UR-MCNC: 54 MG/DL — HIGH (ref 0–12)
PROT SERPL-MCNC: 6.5 G/DL — SIGNIFICANT CHANGE UP (ref 6–8.3)
PROT/CREAT UR-RTO: 0.5 RATIO — HIGH (ref 0–0.2)
RBC # BLD: 2.99 M/UL — LOW (ref 3.8–5.2)
RBC # FLD: 14.3 % — SIGNIFICANT CHANGE UP (ref 10.3–14.5)
RH IG SCN BLD-IMP: POSITIVE — SIGNIFICANT CHANGE UP
SARS-COV-2 IGG+IGM SERPL QL IA: >250 U/ML — HIGH
SARS-COV-2 IGG+IGM SERPL QL IA: POSITIVE
SARS-COV-2 RNA SPEC QL NAA+PROBE: SIGNIFICANT CHANGE UP
SODIUM SERPL-SCNC: 139 MMOL/L — SIGNIFICANT CHANGE UP (ref 135–145)
T PALLIDUM AB TITR SER: NEGATIVE — SIGNIFICANT CHANGE UP
URATE SERPL-MCNC: 5.2 MG/DL — SIGNIFICANT CHANGE UP (ref 2.5–7)
WBC # BLD: 12.64 K/UL — HIGH (ref 3.8–10.5)
WBC # FLD AUTO: 12.64 K/UL — HIGH (ref 3.8–10.5)

## 2021-12-30 PROCEDURE — 59400 OBSTETRICAL CARE: CPT | Mod: U9

## 2021-12-30 RX ORDER — OXYTOCIN 10 UNIT/ML
333.33 VIAL (ML) INJECTION
Qty: 20 | Refills: 0 | Status: DISCONTINUED | OUTPATIENT
Start: 2021-12-30 | End: 2021-12-31

## 2021-12-30 RX ORDER — ACETAMINOPHEN 500 MG
975 TABLET ORAL
Refills: 0 | Status: DISCONTINUED | OUTPATIENT
Start: 2021-12-30 | End: 2021-12-31

## 2021-12-30 RX ORDER — SIMETHICONE 80 MG/1
80 TABLET, CHEWABLE ORAL EVERY 4 HOURS
Refills: 0 | Status: DISCONTINUED | OUTPATIENT
Start: 2021-12-30 | End: 2021-12-31

## 2021-12-30 RX ORDER — SODIUM CHLORIDE 9 MG/ML
3 INJECTION INTRAMUSCULAR; INTRAVENOUS; SUBCUTANEOUS EVERY 8 HOURS
Refills: 0 | Status: DISCONTINUED | OUTPATIENT
Start: 2021-12-30 | End: 2021-12-31

## 2021-12-30 RX ORDER — OXYCODONE HYDROCHLORIDE 5 MG/1
5 TABLET ORAL
Refills: 0 | Status: DISCONTINUED | OUTPATIENT
Start: 2021-12-30 | End: 2021-12-31

## 2021-12-30 RX ORDER — HYDROCORTISONE 1 %
1 OINTMENT (GRAM) TOPICAL EVERY 6 HOURS
Refills: 0 | Status: DISCONTINUED | OUTPATIENT
Start: 2021-12-30 | End: 2021-12-31

## 2021-12-30 RX ORDER — LANOLIN
1 OINTMENT (GRAM) TOPICAL EVERY 6 HOURS
Refills: 0 | Status: DISCONTINUED | OUTPATIENT
Start: 2021-12-30 | End: 2021-12-31

## 2021-12-30 RX ORDER — DIBUCAINE 1 %
1 OINTMENT (GRAM) RECTAL EVERY 6 HOURS
Refills: 0 | Status: DISCONTINUED | OUTPATIENT
Start: 2021-12-30 | End: 2021-12-31

## 2021-12-30 RX ORDER — OXYCODONE HYDROCHLORIDE 5 MG/1
5 TABLET ORAL ONCE
Refills: 0 | Status: DISCONTINUED | OUTPATIENT
Start: 2021-12-30 | End: 2021-12-31

## 2021-12-30 RX ORDER — DIPHENHYDRAMINE HCL 50 MG
25 CAPSULE ORAL EVERY 6 HOURS
Refills: 0 | Status: DISCONTINUED | OUTPATIENT
Start: 2021-12-30 | End: 2021-12-31

## 2021-12-30 RX ORDER — AER TRAVELER 0.5 G/1
1 SOLUTION RECTAL; TOPICAL EVERY 4 HOURS
Refills: 0 | Status: DISCONTINUED | OUTPATIENT
Start: 2021-12-30 | End: 2021-12-31

## 2021-12-30 RX ORDER — KETOROLAC TROMETHAMINE 30 MG/ML
30 SYRINGE (ML) INJECTION ONCE
Refills: 0 | Status: DISCONTINUED | OUTPATIENT
Start: 2021-12-30 | End: 2021-12-31

## 2021-12-30 RX ORDER — MAGNESIUM HYDROXIDE 400 MG/1
30 TABLET, CHEWABLE ORAL
Refills: 0 | Status: DISCONTINUED | OUTPATIENT
Start: 2021-12-30 | End: 2021-12-31

## 2021-12-30 RX ORDER — IBUPROFEN 200 MG
600 TABLET ORAL EVERY 6 HOURS
Refills: 0 | Status: DISCONTINUED | OUTPATIENT
Start: 2021-12-30 | End: 2021-12-31

## 2021-12-30 RX ORDER — PRAMOXINE HYDROCHLORIDE 150 MG/15G
1 AEROSOL, FOAM RECTAL EVERY 4 HOURS
Refills: 0 | Status: DISCONTINUED | OUTPATIENT
Start: 2021-12-30 | End: 2021-12-31

## 2021-12-30 RX ORDER — TETANUS TOXOID, REDUCED DIPHTHERIA TOXOID AND ACELLULAR PERTUSSIS VACCINE, ADSORBED 5; 2.5; 8; 8; 2.5 [IU]/.5ML; [IU]/.5ML; UG/.5ML; UG/.5ML; UG/.5ML
0.5 SUSPENSION INTRAMUSCULAR ONCE
Refills: 0 | Status: DISCONTINUED | OUTPATIENT
Start: 2021-12-30 | End: 2021-12-31

## 2021-12-30 RX ORDER — BENZOCAINE 10 %
1 GEL (GRAM) MUCOUS MEMBRANE EVERY 6 HOURS
Refills: 0 | Status: DISCONTINUED | OUTPATIENT
Start: 2021-12-30 | End: 2021-12-31

## 2021-12-30 RX ORDER — IBUPROFEN 200 MG
600 TABLET ORAL EVERY 6 HOURS
Refills: 0 | Status: COMPLETED | OUTPATIENT
Start: 2021-12-30 | End: 2022-11-28

## 2021-12-30 RX ADMIN — Medication 600 MILLIGRAM(S): at 18:07

## 2021-12-30 RX ADMIN — Medication 975 MILLIGRAM(S): at 15:09

## 2021-12-30 RX ADMIN — OXYCODONE HYDROCHLORIDE 5 MILLIGRAM(S): 5 TABLET ORAL at 13:29

## 2021-12-30 RX ADMIN — Medication 975 MILLIGRAM(S): at 22:00

## 2021-12-30 RX ADMIN — SODIUM CHLORIDE 3 MILLILITER(S): 9 INJECTION INTRAMUSCULAR; INTRAVENOUS; SUBCUTANEOUS at 23:26

## 2021-12-30 RX ADMIN — OXYCODONE HYDROCHLORIDE 5 MILLIGRAM(S): 5 TABLET ORAL at 22:48

## 2021-12-30 RX ADMIN — Medication 975 MILLIGRAM(S): at 08:40

## 2021-12-30 RX ADMIN — Medication 600 MILLIGRAM(S): at 13:30

## 2021-12-30 RX ADMIN — OXYCODONE HYDROCHLORIDE 5 MILLIGRAM(S): 5 TABLET ORAL at 06:18

## 2021-12-30 RX ADMIN — OXYCODONE HYDROCHLORIDE 5 MILLIGRAM(S): 5 TABLET ORAL at 07:03

## 2021-12-30 RX ADMIN — Medication 975 MILLIGRAM(S): at 09:20

## 2021-12-30 RX ADMIN — Medication 1 APPLICATION(S): at 15:09

## 2021-12-30 RX ADMIN — Medication 600 MILLIGRAM(S): at 12:48

## 2021-12-30 RX ADMIN — Medication 975 MILLIGRAM(S): at 16:00

## 2021-12-30 RX ADMIN — Medication 975 MILLIGRAM(S): at 21:06

## 2021-12-30 RX ADMIN — OXYCODONE HYDROCHLORIDE 5 MILLIGRAM(S): 5 TABLET ORAL at 10:08

## 2021-12-30 RX ADMIN — Medication 600 MILLIGRAM(S): at 18:53

## 2021-12-30 RX ADMIN — OXYCODONE HYDROCHLORIDE 5 MILLIGRAM(S): 5 TABLET ORAL at 23:45

## 2021-12-30 RX ADMIN — SODIUM CHLORIDE 3 MILLILITER(S): 9 INJECTION INTRAMUSCULAR; INTRAVENOUS; SUBCUTANEOUS at 12:00

## 2021-12-30 RX ADMIN — Medication 1 SPRAY(S): at 15:09

## 2021-12-30 RX ADMIN — Medication 1 TABLET(S): at 15:08

## 2021-12-31 ENCOUNTER — TRANSCRIPTION ENCOUNTER (OUTPATIENT)
Age: 21
End: 2021-12-31

## 2021-12-31 VITALS
OXYGEN SATURATION: 99 % | TEMPERATURE: 98 F | SYSTOLIC BLOOD PRESSURE: 101 MMHG | HEART RATE: 77 BPM | RESPIRATION RATE: 17 BRPM | DIASTOLIC BLOOD PRESSURE: 61 MMHG

## 2021-12-31 LAB
HCT VFR BLD CALC: 20.9 % — CRITICAL LOW (ref 34.5–45)
HGB BLD-MCNC: 6.5 G/DL — CRITICAL LOW (ref 11.5–15.5)
MCHC RBC-ENTMCNC: 24.7 PG — LOW (ref 27–34)
MCHC RBC-ENTMCNC: 31.1 GM/DL — LOW (ref 32–36)
MCV RBC AUTO: 79.5 FL — LOW (ref 80–100)
NRBC # BLD: 0 /100 WBCS — SIGNIFICANT CHANGE UP (ref 0–0)
PLATELET # BLD AUTO: 114 K/UL — LOW (ref 150–400)
RBC # BLD: 2.63 M/UL — LOW (ref 3.8–5.2)
RBC # FLD: 14.6 % — HIGH (ref 10.3–14.5)
WBC # BLD: 10.76 K/UL — HIGH (ref 3.8–10.5)
WBC # FLD AUTO: 10.76 K/UL — HIGH (ref 3.8–10.5)

## 2021-12-31 PROCEDURE — 86769 SARS-COV-2 COVID-19 ANTIBODY: CPT

## 2021-12-31 PROCEDURE — 82962 GLUCOSE BLOOD TEST: CPT

## 2021-12-31 PROCEDURE — 36415 COLL VENOUS BLD VENIPUNCTURE: CPT

## 2021-12-31 PROCEDURE — 85027 COMPLETE CBC AUTOMATED: CPT

## 2021-12-31 PROCEDURE — 82570 ASSAY OF URINE CREATININE: CPT

## 2021-12-31 PROCEDURE — 83615 LACTATE (LD) (LDH) ENZYME: CPT

## 2021-12-31 PROCEDURE — 86900 BLOOD TYPING SEROLOGIC ABO: CPT

## 2021-12-31 PROCEDURE — 86850 RBC ANTIBODY SCREEN: CPT

## 2021-12-31 PROCEDURE — 84550 ASSAY OF BLOOD/URIC ACID: CPT

## 2021-12-31 PROCEDURE — 80053 COMPREHEN METABOLIC PANEL: CPT

## 2021-12-31 PROCEDURE — U0005: CPT

## 2021-12-31 PROCEDURE — 99214 OFFICE O/P EST MOD 30 MIN: CPT

## 2021-12-31 PROCEDURE — 85025 COMPLETE CBC W/AUTO DIFF WBC: CPT

## 2021-12-31 PROCEDURE — 86901 BLOOD TYPING SEROLOGIC RH(D): CPT

## 2021-12-31 PROCEDURE — U0003: CPT

## 2021-12-31 PROCEDURE — 85384 FIBRINOGEN ACTIVITY: CPT

## 2021-12-31 PROCEDURE — 84156 ASSAY OF PROTEIN URINE: CPT

## 2021-12-31 PROCEDURE — 86780 TREPONEMA PALLIDUM: CPT

## 2021-12-31 RX ORDER — IBUPROFEN 200 MG
1 TABLET ORAL
Qty: 0 | Refills: 0 | DISCHARGE
Start: 2021-12-31

## 2021-12-31 RX ORDER — ACETAMINOPHEN 500 MG
3 TABLET ORAL
Qty: 0 | Refills: 0 | DISCHARGE
Start: 2021-12-31

## 2021-12-31 RX ADMIN — Medication 600 MILLIGRAM(S): at 05:28

## 2021-12-31 RX ADMIN — Medication 600 MILLIGRAM(S): at 00:28

## 2021-12-31 RX ADMIN — Medication 1 APPLICATION(S): at 13:12

## 2021-12-31 RX ADMIN — AER TRAVELER 1 APPLICATION(S): 0.5 SOLUTION RECTAL; TOPICAL at 13:12

## 2021-12-31 RX ADMIN — SODIUM CHLORIDE 3 MILLILITER(S): 9 INJECTION INTRAMUSCULAR; INTRAVENOUS; SUBCUTANEOUS at 05:07

## 2021-12-31 RX ADMIN — Medication 600 MILLIGRAM(S): at 13:12

## 2021-12-31 RX ADMIN — Medication 975 MILLIGRAM(S): at 03:48

## 2021-12-31 RX ADMIN — Medication 600 MILLIGRAM(S): at 01:30

## 2021-12-31 RX ADMIN — Medication 975 MILLIGRAM(S): at 04:45

## 2021-12-31 RX ADMIN — Medication 975 MILLIGRAM(S): at 10:18

## 2021-12-31 RX ADMIN — Medication 975 MILLIGRAM(S): at 09:18

## 2021-12-31 NOTE — DISCHARGE NOTE OB - CARE PROVIDER_API CALL
Dexter Campbell)  Obstetrics and Gynecology  [address], [facility], [place] [address]  Phone: [phone]  Fax: [phone]  Follow Up Time:

## 2021-12-31 NOTE — DISCHARGE NOTE OB - CARE PLAN
Principal Discharge DX:	Postpartum state  Assessment and plan of treatment:	routine postpartum care   1

## 2021-12-31 NOTE — PROGRESS NOTE ADULT - ASSESSMENT
A/P 21y s/p , PPD # 1 , stable, meeting postpartum milestones   - Labial swelling- resolving, ice packs encouraged  - Pain: well controlled on opm  - GI: Tolerating regular diet  - : urinating without difficulty/pain  -DVT prophylaxis: ambulating frequently  -Dispo: PPD 1-2, unless otherwise specified

## 2021-12-31 NOTE — DISCHARGE NOTE OB - NS MD DC FALL RISK RISK
For information on Fall & Injury Prevention, visit: [email or web address] OR  [email or web address] OR  https://www.cdc.gov/steadi/patient.html

## 2021-12-31 NOTE — LACTATION INITIAL EVALUATION - NS LACT CON REASON FOR REQ
Met with dyad at about 24 hours of life s/p  @ 40 wks gestation. Pt is now P1 and denied any significant medical history. FOB is Covid-19 positive and is home. Pt has been formula feeding since infant was born but has now decided to try and breastfeed. Spoke about the possibility of infant not sustaining a latch due to flow preference and the importance of pumping until infant is consistently latching at the breast if she would like to have a full milk supply. Assisted with latching infant to the breast via the cross cradle and football holds on the left side. Infant was able to latch deeply to both breasts but would suck for a couple of minutes before unlatching and becoming frustrated. Infant was given some formula, pt educated on paced bottle feeding and then infant was brought back to the breast. Infant would sustain a latch longer after getting some formula. Once infant was finished feeding, pt was set up on the breast pump and educated on its use. Reviewed breastfeeding education including the milk production feedback system, responsive/cue based feedings, infant feeding cues, feeding frequency, benefits of skin to skin, how to determine the infant is transferring enough at the breast and cluster feeding. Reviewed pertinent information in the "Your Guide To Postpartum And [place] Care" book. Answered all questions. Informed about LC availability. Pt verbalized understanding of all information discussed./primaparous mom

## 2021-12-31 NOTE — DISCHARGE NOTE OB - MATERIALS PROVIDED
Immunization Record/Breastfeeding Log/Bottle Feeding Log/Breastfeeding Mother’s Support Group Information/Guide to Postpartum Care/[facility] Hearing Screen Program/Shaken Baby Prevention Handout/Discharge Medication Information for Patients and Families Pocket Guide

## 2021-12-31 NOTE — DISCHARGE NOTE OB - HOSPITAL COURSE
Patient admitted to L&D for vaginal delivery.  Intrapartum course uneventful.  Postpartum course notable for several mild range pressures and proteinuria, however patient did not meet criteria for preeclampsia.  Discharged home on PP Day #1.  Patient admitted to L&D for vaginal delivery.  Intrapartum course uneventful.  Postpartum course notable for several mild range pressures and proteinuria, however patient did not meet criteria for preeclampsia.  Discharged home on PP Day #2.

## 2021-12-31 NOTE — DISCHARGE NOTE OB - PATIENT PORTAL LINK FT
You can access the FollowMyHealth Patient Portal offered by [facility] by registering at the following website: [email or web address]. By joining [facility]’s FollowMyHealth portal, you will also be able to view your health information using other applications (apps) compatible with our system.

## 2021-12-31 NOTE — LACTATION INITIAL EVALUATION - LACTATION INTERVENTIONS
initiate/review safe skin-to-skin/initiate/review hand expression/initiate/review techniques for position and latch/post discharge community resources provided/review techniques to increase milk supply/initiate/review breast massage/compression/reviewed components of an effective feeding and at least 8 effective feedings per day required/reviewed importance of monitoring infant diapers, the breastfeeding log, and minimum output each day/reviewed risks of artificial nipples/reviewed strategies to transition to breastfeeding only/reviewed benefits and recommendations for rooming in/reviewed feeding on demand/by cue at least 8 times a day

## 2021-12-31 NOTE — PROGRESS NOTE ADULT - SUBJECTIVE AND OBJECTIVE BOX
Patient evaluated at bedside.   She reports pain is well controlled with oral NSAID's.  Mrs. Ibarra does report perineal discomfort when walking but was able to walk to the restroom and void urine voluntarily.    She denies headache, dizziness, chest pain, palpitations, shortness of breath, nausea, vomiting, heavy vaginal bleeding or perineal discomfort.  She has been ambulating without assistance, voiding spontaneously, and is breast and bottle feeding.  Tolerating regular diet    Physical Exam:  VSS:     GA: NAD, A+0 x 3  CV: RRR  Pulm: CTAB  Breasts: soft, nontender, no palpable masses  Abd: + BS, soft, nontender, nondistended, no rebound or guarding, uterus firm at midline,  3 fb below umbilicus  : lochia WNL; Perineum: extensive swelling of the vulva.  No evidence of hematoma.    Extremities: tr BL LE swelling w/o calf tenderness, reflexes +2 bilaterally                            7.4    12.64 )-----------( 121      ( 30 Dec 2021 14:19 )             23.3         139  |  106  |  13  ----------------------------<  50<LL>  4.5   |  21<L>  |  0.51    Ca    9.3      30 Dec 2021 03:49    TPro  6.5  /  Alb  3.9  /  TBili  0.2  /  DBili  x   /  AST  24  /  ALT  15  /  AlkPhos  140<H>      Assessment:   PPD #  0  S/P  with BL sulcus and periurethral tears  PUI for Covid; partner positive    Plan:    Repeat CBC in am  Supportive Care  Diet as tolerated  Oral pain Meds  OOB as tolerated  D/C possible tomorrow; PPD 1  
Patient evaluated at bedside this morning, resting comfortable in bed, no acute events overnight.  She reports pain is well controlled with tylenol and motrin  She denies headache, dizziness, chest pain, palpitations, shortness of breath, nausea, vomiting, heavy vaginal bleeding or perineal discomfort. Reports decrease in amount of vaginal bleeding and denies clots.  She has been ambulating without assistance, voiding spontaneously, and is breastfeeding.   Tolerating food well, without nausea/vomit.  Passing flatus.     Physical Exam:  T(C): 36.7 (12-31-21 @ 05:39), Max: 36.7 (12-30-21 @ 22:03)  HR: 80 (12-31-21 @ 05:39) (80 - 82)  BP: 106/51 (12-31-21 @ 05:39) (100/62 - 106/51)  RR: 18 (12-31-21 @ 05:39) (18 - 18)  SpO2: 97% (12-31-21 @ 05:39) (97% - 97%)    GA: NAD, A&O x 3  Pulm: non distressed breathing  Abd: + BS, soft, nontender, nondistended, no rebound or guarding, uterus firm at midline and 2  fb below umbilicus  Perineum: normal lochia, intact, healing well, no hematoma, labia minora swelling  Extremities: no calf tenderness  Skin: no rashes                          7.4    12.64 )-----------( 121      ( 30 Dec 2021 14:19 )             23.3     12-30    139  |  106  |  13  ----------------------------<  50<LL>  4.5   |  21<L>  |  0.51    Ca    9.3      30 Dec 2021 03:49    TPro  6.5  /  Alb  3.9  /  TBili  0.2  /  DBili  x   /  AST  24  /  ALT  15  /  AlkPhos  140<H>  12-30    acetaminophen     Tablet .. 975 milliGRAM(s) Oral <User Schedule>  benzocaine 20%/menthol 0.5% Spray 1 Spray(s) Topical every 6 hours PRN  dibucaine 1% Ointment 1 Application(s) Topical every 6 hours PRN  diphenhydrAMINE 25 milliGRAM(s) Oral every 6 hours PRN  diphtheria/tetanus/pertussis (acellular) Vaccine (ADAcel) 0.5 milliLiter(s) IntraMuscular once  fentanyl (2 MICROgram(s)/mL) + bupivacaine 0.1% in 0.9% Sodium Chloride PCEA 250 milliLiter(s) Epidural PCA Continuous  hydrocortisone 1% Cream 1 Application(s) Topical every 6 hours PRN  ibuprofen  Tablet. 600 milliGRAM(s) Oral every 6 hours  ketorolac   Injectable 30 milliGRAM(s) IV Push once  lanolin Ointment 1 Application(s) Topical every 6 hours PRN  magnesium hydroxide Suspension 30 milliLiter(s) Oral two times a day PRN  oxyCODONE    IR 5 milliGRAM(s) Oral every 3 hours PRN  oxyCODONE    IR 5 milliGRAM(s) Oral once PRN  oxytocin Infusion 333.333 milliUNIT(s)/Min IV Continuous <Continuous>  pramoxine 1%/zinc 5% Cream 1 Application(s) Topical every 4 hours PRN  prenatal multivitamin 1 Tablet(s) Oral daily  simethicone 80 milliGRAM(s) Chew every 4 hours PRN  sodium chloride 0.9% lock flush 3 milliLiter(s) IV Push every 8 hours  witch hazel Pads 1 Application(s) Topical every 4 hours PRN

## 2021-12-31 NOTE — LACTATION INITIAL EVALUATION - POTENTIAL FOR
ineffective breastfeeding/knowledge deficit/feeding confusion/low supply/delayed secretory activation

## 2022-01-02 RX ORDER — OXYCODONE 5 MG/1
5 TABLET ORAL
Qty: 16 | Refills: 0 | Status: ACTIVE | COMMUNITY
Start: 2022-01-02 | End: 1900-01-01

## 2022-01-03 ENCOUNTER — NON-APPOINTMENT (OUTPATIENT)
Age: 22
End: 2022-01-03

## 2022-01-05 ENCOUNTER — NON-APPOINTMENT (OUTPATIENT)
Age: 22
End: 2022-01-05

## 2022-01-05 ENCOUNTER — APPOINTMENT (OUTPATIENT)
Dept: OBGYN | Facility: CLINIC | Age: 22
End: 2022-01-05
Payer: MEDICAID

## 2022-01-05 VITALS — SYSTOLIC BLOOD PRESSURE: 120 MMHG | DIASTOLIC BLOOD PRESSURE: 80 MMHG

## 2022-01-05 DIAGNOSIS — Z34.83 ENCOUNTER FOR SUPERVISION OF OTHER NORMAL PREGNANCY, THIRD TRIMESTER: ICD-10-CM

## 2022-01-05 DIAGNOSIS — D64.9 ANEMIA, UNSPECIFIED: ICD-10-CM

## 2022-01-05 DIAGNOSIS — Z28.09 IMMUNIZATION NOT CARRIED OUT BECAUSE OF OTHER CONTRAINDICATION: ICD-10-CM

## 2022-01-05 DIAGNOSIS — K80.20 CALCULUS OF GALLBLADDER WITHOUT CHOLECYSTITIS WITHOUT OBSTRUCTION: ICD-10-CM

## 2022-01-05 DIAGNOSIS — Z3A.40 40 WEEKS GESTATION OF PREGNANCY: ICD-10-CM

## 2022-01-05 PROCEDURE — 36415 COLL VENOUS BLD VENIPUNCTURE: CPT

## 2022-01-05 PROCEDURE — 99213 OFFICE O/P EST LOW 20 MIN: CPT

## 2022-01-10 LAB
BASOPHILS # BLD AUTO: 0.04 K/UL
BASOPHILS NFR BLD AUTO: 0.5 %
EOSINOPHIL # BLD AUTO: 0.24 K/UL
EOSINOPHIL NFR BLD AUTO: 2.8 %
HCT VFR BLD CALC: 27.6 %
HGB BLD-MCNC: 8.4 G/DL
IMM GRANULOCYTES NFR BLD AUTO: 0.6 %
LYMPHOCYTES # BLD AUTO: 1.4 K/UL
LYMPHOCYTES NFR BLD AUTO: 16.1 %
MAN DIFF?: NORMAL
MCHC RBC-ENTMCNC: 25.4 PG
MCHC RBC-ENTMCNC: 30.4 GM/DL
MCV RBC AUTO: 83.4 FL
MONOCYTES # BLD AUTO: 0.33 K/UL
MONOCYTES NFR BLD AUTO: 3.8 %
NEUTROPHILS # BLD AUTO: 6.62 K/UL
NEUTROPHILS NFR BLD AUTO: 76.2 %
PLATELET # BLD AUTO: 260 K/UL
RBC # BLD: 3.31 M/UL
RBC # FLD: 15.6 %
WBC # FLD AUTO: 8.68 K/UL

## 2022-01-10 NOTE — HISTORY OF PRESENT ILLNESS
[Postpartum Follow Up] : postpartum follow up [Delivery Date: ___] : on [unfilled] [] : delivered by vaginal delivery [Female] : Delivery History: baby girl [Wt. ___] : weighing [unfilled] [Breastfeeding] : currently nursing [FreeTextEntry8] : Follow up one week post partum.

## 2022-01-13 ENCOUNTER — APPOINTMENT (OUTPATIENT)
Dept: OBGYN | Facility: CLINIC | Age: 22
End: 2022-01-13

## 2022-01-14 ENCOUNTER — NON-APPOINTMENT (OUTPATIENT)
Age: 22
End: 2022-01-14

## 2022-01-19 ENCOUNTER — APPOINTMENT (OUTPATIENT)
Dept: OBGYN | Facility: CLINIC | Age: 22
End: 2022-01-19
Payer: MEDICAID

## 2022-01-19 VITALS
HEIGHT: 69 IN | DIASTOLIC BLOOD PRESSURE: 69 MMHG | BODY MASS INDEX: 20.73 KG/M2 | SYSTOLIC BLOOD PRESSURE: 112 MMHG | WEIGHT: 140 LBS

## 2022-01-19 PROCEDURE — 0503F POSTPARTUM CARE VISIT: CPT

## 2022-01-28 DIAGNOSIS — N94.9 UNSPECIFIED CONDITION ASSOCIATED WITH FEMALE GENITAL ORGANS AND MENSTRUAL CYCLE: ICD-10-CM

## 2022-01-28 RX ORDER — FLUCONAZOLE 150 MG/1
150 TABLET ORAL
Qty: 1 | Refills: 0 | Status: ACTIVE | COMMUNITY
Start: 2022-01-28 | End: 1900-01-01

## 2022-02-01 NOTE — HISTORY OF PRESENT ILLNESS
[Delivery Date: ___] : on [unfilled] [Female] : Delivery History: baby girl [Wt. ___] : weighing [unfilled] [Breastfeeding] : currently nursing [Postpartum Follow Up] : postpartum follow up [] : delivered by vaginal delivery [Back to Normal] : is back to normal in size [Mild] : mild vaginal bleeding [Normal] : the vagina was normal [Not Done] : Examination of breasts not done [Doing Well] : is doing well [No Sign of Infection] : is showing no signs of infection [Excellent Pain Control] : has excellent pain control [Complications:___] : no complications [S/Sx PP Depression] : no signs/symptoms of postpartum depression [Episiotomy Site Pain] : no episiotomy site pain [None] : No associated symptoms are reported [Erythema] : not erythematous [___ wks] : is [unfilled] weeks in size [Hematoma] : no vaginal hematoma [Abscess Formation] : no vaginal abscess [Healing Well] : is healing well [Infected] : did not appear infected [Dehiscence] : was not dehisced [Cervix Sample Taken] : cervical sample not taken for a Pap smear [Awake] : awake [Alert] : alert [Acute Distress] : no acute distress [Soft] : soft [Tender] : non tender [Distended] : not distended [H/Smegaly] : no hepatosplenomegaly [Oriented x3] : oriented to person, place, and time [Depressed Mood] : not depressed [Flat Affect] : affect not flat [Normal Mental Status] : the patient was oriented to person, place and time [1+] : left 1+ [No Pitting Edema] : no pitting edema present [Rt] : no varicose veins of the right leg [Lt] : no varicose veins of the left leg [FreeTextEntry8] : JACKIE BERNAL  IS A 21 year  OLD WOMAN S/P  ON 21 OF A  BABY GIRL.  TODAY SHE REPORTS SHE IS HAPPY AND HAS SUPPORT FROM FAMILY AND FRIENDS.  SHE IS BREASTFEEDING AND REPORTS IT IS GOING WELL.  SHE DENIES NEED FOR PAIN MEDS. [de-identified] : BL SULCUS TEARS AND EXTENSIVE PERINEAL SWELLING  [de-identified] : MUCH IMPROVED S/P VAGINAL DELIVERY COMPLICATED BY EXTENSIVE PERINEAL AND VAGINAL LACERATIONS [de-identified] : RTO 4-6 MOS FOR GYN FOLLOW UP

## 2022-02-07 ENCOUNTER — NON-APPOINTMENT (OUTPATIENT)
Age: 22
End: 2022-02-07

## 2022-02-08 ENCOUNTER — APPOINTMENT (OUTPATIENT)
Dept: OBGYN | Facility: CLINIC | Age: 22
End: 2022-02-08

## 2022-02-10 ENCOUNTER — APPOINTMENT (OUTPATIENT)
Dept: OBGYN | Facility: CLINIC | Age: 22
End: 2022-02-10
Payer: MEDICAID

## 2022-02-10 VITALS — WEIGHT: 140 LBS | DIASTOLIC BLOOD PRESSURE: 70 MMHG | SYSTOLIC BLOOD PRESSURE: 110 MMHG

## 2022-02-10 PROCEDURE — 0503F POSTPARTUM CARE VISIT: CPT

## 2022-02-11 LAB — HPV HIGH+LOW RISK DNA PNL CVX: NOT DETECTED

## 2022-02-11 NOTE — HISTORY OF PRESENT ILLNESS
[Complications:___] : no complications [Delivery Date: ___] : on [unfilled] [Female] : Delivery History: baby girl [Breastfeeding] : not currently nursing [Resumed Menses] : has not resumed her menses [Resumed [place]] : has not resumed intercourse [Intended Contraception] : Intended Contraception: [Oral Contraceptives] : oral contraceptives [Back to Normal] : is back to normal in size [Cervix Sample Taken] : cervical sample taken for a Pap smear [Not Done] : Examination of breasts not done [Awake] : awake [Alert] : alert [Acute Distress] : no acute distress [Soft] : soft [Tender] : non tender [Distended] : not distended [H/Smegaly] : no hepatosplenomegaly [Oriented x3] : oriented to person, place, and time [Depressed Mood] : not depressed [Flat Affect] : affect not flat [1+] : left 1+ [No Lesions] : no genitalia lesions [Vulvar Atrophy] : no vulvar atrophy [Vulvitis] : no vulvitis [Vulvar Stricture] : no vulvar stricture [Labia Majora] : labia major [Labia Minora] : labia minora [Normal] : clitoris [Pink Rugae] : pink rugae [Cystocele] : no cystocele [Rectocele] : no rectocele [No Bleeding] : there was no active vaginal bleeding [Pap Obtained] : a Pap smear was performed [Dilated] : the cervix was not dilated [Motion Tenderness] : there was no cervical motion tenderness [Normal Position] : in a normal position [Tenderness] : nontender [Enlarged ___ wks] : not enlarged [Uterine Adnexae] : were not tender and not enlarged [Adnexa Tenderness] : were not tender [Ovarian Mass (___ Cm)] : there were no adnexal masses [No Pitting Edema] : no pitting edema present [Doing Well] : is doing well [No Sign of Infection] : is showing no signs of infection [None] : None [FreeTextEntry8] : Patient presents for 6 week post partum.  [de-identified] : BL SULCUS TEARS  [FreeTextEntry4] : WELL HEALED [de-identified] : WELL S/P  [de-identified] : RTO PRN

## 2022-03-07 LAB — CYTOLOGY CVX/VAG DOC THIN PREP: NORMAL

## 2022-07-05 DIAGNOSIS — Z34.90 ENCOUNTER FOR SUPERVISION OF NORMAL PREGNANCY, UNSPECIFIED, UNSPECIFIED TRIMESTER: ICD-10-CM

## 2022-07-05 DIAGNOSIS — U07.1 OTHER VIRAL DISEASES COMPLICATING PREGNANCY, UNSPECIFIED TRIMESTER: ICD-10-CM

## 2022-07-05 DIAGNOSIS — O98.519 OTHER VIRAL DISEASES COMPLICATING PREGNANCY, UNSPECIFIED TRIMESTER: ICD-10-CM

## 2022-07-05 DIAGNOSIS — R52 OTHER COMPLICATIONS OF THE PUERPERIUM, NOT ELSEWHERE CLASSIFIED: ICD-10-CM

## 2022-07-20 DIAGNOSIS — Z30.09 ENCOUNTER FOR OTHER GENERAL COUNSELING AND ADVICE ON CONTRACEPTION: ICD-10-CM

## 2022-07-20 RX ORDER — NORETHINDRONE ACETATE AND ETHINYL ESTRADIOL 1; 20 MG/1; UG/1
1-20 TABLET ORAL DAILY
Qty: 3 | Refills: 0 | Status: ACTIVE | COMMUNITY
Start: 2022-07-20 | End: 1900-01-01

## 2022-07-20 RX ORDER — NORGESTREL AND ETHINYL ESTRADIOL 0.3-0.03MG
0.3-3 KIT ORAL DAILY
Qty: 3 | Refills: 2 | Status: DISCONTINUED | COMMUNITY
Start: 2022-05-06 | End: 2022-07-20

## 2022-07-20 RX ORDER — NORETHINDRONE ACETATE AND ETHINYL ESTRADIOL 1.5; 3 MG/1; UG/1
1.5-3 TABLET ORAL DAILY
Qty: 3 | Refills: 1 | Status: DISCONTINUED | COMMUNITY
Start: 2022-07-20 | End: 2022-07-20

## 2022-07-20 RX ORDER — NORETHINDRONE ACETATE AND ETHINYL ESTRADIOL, ETHINYL ESTRADIOL AND FERROUS FUMARATE 1MG-10(24)
1 MG-10 MCG / KIT ORAL DAILY
Qty: 3 | Refills: 3 | Status: DISCONTINUED | COMMUNITY
Start: 2022-01-20 | End: 2022-07-20

## 2022-11-09 NOTE — ED ADULT NURSE NOTE - NSSUHOSCREENINGYN_ED_ALL_ED
- likely secondary to volume overload  - Getting diuresed with lasix 40 mg bid  - Strict I/Os  - resolved   Referral faxed. Patient aware     Yes - the patient is able to be screened

## 2022-12-13 NOTE — ED PROVIDER NOTE - NORMAL, MLM
Requested Prescriptions     Pending Prescriptions Disp Refills    omeprazole (PRILOSEC) 40 MG delayed release capsule [Pharmacy Med Name: OMEPRAZOLE 40MG CAPSULES] 30 capsule 1     Sig: TAKE 1 CAPSULE BY MOUTH EVERY MORNING BEFORE BREAKFAST            Last Office Visit: 11/11/2022     Next Office Visit: Visit date not found     Last Labs: 11/8/22
zayda all pertinent systems normal

## 2023-01-12 ENCOUNTER — APPOINTMENT (OUTPATIENT)
Dept: OBGYN | Facility: CLINIC | Age: 23
End: 2023-01-12
Payer: MEDICAID

## 2023-01-12 VITALS — DIASTOLIC BLOOD PRESSURE: 78 MMHG | WEIGHT: 141 LBS | SYSTOLIC BLOOD PRESSURE: 117 MMHG

## 2023-01-12 DIAGNOSIS — R10.2 PELVIC AND PERINEAL PAIN: ICD-10-CM

## 2023-01-12 DIAGNOSIS — Z01.419 ENCOUNTER FOR GYNECOLOGICAL EXAMINATION (GENERAL) (ROUTINE) W/OUT ABNORMAL FINDINGS: ICD-10-CM

## 2023-01-12 LAB
BILIRUB UR QL STRIP: NORMAL
GLUCOSE UR-MCNC: NORMAL
HCG UR QL: 0.2 EU/DL
HCG UR QL: NEGATIVE
HGB UR QL STRIP.AUTO: NORMAL
KETONES UR-MCNC: NORMAL
LEUKOCYTE ESTERASE UR QL STRIP: NORMAL
NITRITE UR QL STRIP: NORMAL
PH UR STRIP: 7
PROT UR STRIP-MCNC: NORMAL
SP GR UR STRIP: 1.02

## 2023-01-12 PROCEDURE — 99212 OFFICE O/P EST SF 10 MIN: CPT | Mod: 25

## 2023-01-12 PROCEDURE — 99395 PREV VISIT EST AGE 18-39: CPT

## 2023-01-12 PROCEDURE — 76830 TRANSVAGINAL US NON-OB: CPT

## 2023-01-12 PROCEDURE — 81025 URINE PREGNANCY TEST: CPT

## 2023-01-12 PROCEDURE — 81003 URINALYSIS AUTO W/O SCOPE: CPT | Mod: QW

## 2023-01-12 RX ORDER — NORGESTREL AND ETHINYL ESTRADIOL 0.3-0.03MG
0.3-3 KIT ORAL
Qty: 1 | Refills: 6 | Status: ACTIVE | COMMUNITY
Start: 2023-01-12 | End: 1900-01-01

## 2023-01-16 LAB
C TRACH RRNA SPEC QL NAA+PROBE: NOT DETECTED
N GONORRHOEA RRNA SPEC QL NAA+PROBE: NOT DETECTED
SOURCE AMPLIFICATION: NORMAL

## 2023-01-16 NOTE — PHYSICAL EXAM
[Chaperone Present] : A chaperone was present in the examining room during all aspects of the physical examination [Appropriately responsive] : appropriately responsive [Alert] : alert [No Acute Distress] : no acute distress [No Lymphadenopathy] : no lymphadenopathy [Clear to Auscultation B/L] : clear to auscultation bilaterally [Soft] : soft [Non-tender] : non-tender [Non-distended] : non-distended [No HSM] : No HSM [No Lesions] : no lesions [No Mass] : no mass [Oriented x3] : oriented x3 [Examination Of The Breasts] : a normal appearance [No Masses] : no breast masses were palpable [Labia Majora] : normal [Labia Minora] : normal [Normal] : normal [Uterine Adnexae] : normal

## 2023-01-16 NOTE — PLAN
[FreeTextEntry1] : Normal Exam ,Benign clinical exam , precautions rev \par -Pap  done \par -Breast self exam reviewed \par -Gc Ct sent \par -Will place on cryselle \par -return visit PRN or 1 year\par

## 2023-01-16 NOTE — PROCEDURE
[Pelvic Pain] : pelvic pain [Transvaginal Ultrasound] : transvaginal ultrasound [Anteverted] : anteverted [FreeTextEntry3] : Pain x 2 wks  [FreeTextEntry5] : thin EE nl size  [FreeTextEntry7] : 3.4 x 1.7 cm no cysts [FreeTextEntry8] : 3 x 2.5 cm no cyst  [FreeTextEntry4] : Normal study

## 2023-01-16 NOTE — HISTORY OF PRESENT ILLNESS
[FreeTextEntry1] : , LMP 1/8, Pt here for annual exam no abnormal vaginal bleeding, pelvic pain or vaginal discharge.\par Pelvic pain last 2 wks in am comes and goes, bilat. no f/c/ no n/v \par some sweats at night.  [Patient reported PAP Smear was normal] : Patient reported PAP Smear was normal [PapSmeardate] : 2022